# Patient Record
Sex: FEMALE | Race: WHITE | NOT HISPANIC OR LATINO | URBAN - METROPOLITAN AREA
[De-identification: names, ages, dates, MRNs, and addresses within clinical notes are randomized per-mention and may not be internally consistent; named-entity substitution may affect disease eponyms.]

---

## 2023-08-21 ENCOUNTER — OFFICE VISIT (OUTPATIENT)
Dept: PRIMARY CARE | Facility: CLINIC | Age: 63
End: 2023-08-21
Payer: COMMERCIAL

## 2023-08-21 DIAGNOSIS — Z12.31 SCREENING MAMMOGRAM FOR BREAST CANCER: ICD-10-CM

## 2023-08-21 DIAGNOSIS — R00.1 BRADYCARDIA: Primary | ICD-10-CM

## 2023-08-21 PROCEDURE — UHSMG PR UH SELECT MEET AND GREET: Performed by: INTERNAL MEDICINE

## 2023-08-21 NOTE — PROGRESS NOTES
Patient presents today for a meet and greet visit.  She is 62 years old  She has been extraordinarily healthy  Hospitalized for childbirth x2 did have plastic surgery at age 19 for her nose and had uterine ablation as an outpatient around 2010  She actually had lost a significant amount of weight over 100 pounds in 2013 by protein sparing modified fast she did regain a fair amount of this weight but then has been doing intermittent fasting since 2000 and really has done amazingly well  She does have some issues with hair loss  She has been seeing Dr. Silvestre for urinary frequency urgency incontinence she is now on estrogen patch which is really helped her menopausal symptoms and also using vaginal estrogen which has been helpful  She did have recent colonoscopy had 1 polyp we will need 5-year repeat however concerning was that she had bradycardia significant down to about 40 on EKG during colonoscopy was told she should have further evaluation  She notes that even when she was young she remembers people talking about her low heart rate  She is asymptomatic  She does exercise routinely  She is able to increment her heart rate to about 120  She used her Apple Watch and her heart rate does drop into the 40s at nighttime  She is originally from [place] 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

## 2023-09-19 PROBLEM — R42 DIZZINESS: Status: ACTIVE | Noted: 2023-09-19

## 2023-09-19 PROBLEM — N95.1 MENOPAUSAL SYMPTOMS: Status: ACTIVE | Noted: 2023-09-19

## 2023-09-19 PROBLEM — M54.16 LUMBAR NEURITIS: Status: ACTIVE | Noted: 2023-09-19

## 2023-09-19 PROBLEM — R32 URINARY INCONTINENCE IN FEMALE: Status: ACTIVE | Noted: 2023-09-19

## 2023-09-19 PROBLEM — N95.8 GENITOURINARY SYNDROME OF MENOPAUSE: Status: ACTIVE | Noted: 2023-09-19

## 2023-09-19 PROBLEM — R06.83 SNORING: Status: ACTIVE | Noted: 2023-09-19

## 2023-09-19 PROBLEM — H93.19 TINNITUS, SUBJECTIVE: Status: ACTIVE | Noted: 2023-09-19

## 2023-09-19 PROBLEM — E55.9 VITAMIN D DEFICIENCY: Status: ACTIVE | Noted: 2023-09-19

## 2023-09-19 PROBLEM — Z04.9 CONDITION NOT FOUND: Status: ACTIVE | Noted: 2023-09-19

## 2023-09-19 PROBLEM — G47.00 INSOMNIA: Status: ACTIVE | Noted: 2023-09-19

## 2023-09-19 PROBLEM — E53.8 DEFICIENCY OF OTHER SPECIFIED B GROUP VITAMINS: Status: ACTIVE | Noted: 2023-09-19

## 2023-09-19 PROBLEM — H69.90 EUSTACHIAN TUBE DYSFUNCTION: Status: ACTIVE | Noted: 2023-09-19

## 2023-09-19 PROBLEM — R63.5 ABNORMAL WEIGHT GAIN: Status: ACTIVE | Noted: 2023-09-19

## 2023-09-19 RX ORDER — SOD SULF/POT CHLORIDE/MAG SULF 1.479 G
TABLET ORAL
COMMUNITY
Start: 2023-03-27 | End: 2023-10-18 | Stop reason: ALTCHOICE

## 2023-09-19 RX ORDER — ESTRADIOL 0.1 MG/G
CREAM VAGINAL 3 TIMES WEEKLY
COMMUNITY
Start: 2023-06-08

## 2023-09-19 RX ORDER — UBIDECARENONE 75 MG
500 CAPSULE ORAL
COMMUNITY
Start: 2015-06-05

## 2023-09-19 RX ORDER — CALCIUM CARBONATE 500(1250)
TABLET ORAL
COMMUNITY
Start: 2015-06-05 | End: 2023-10-18 | Stop reason: ALTCHOICE

## 2023-09-19 RX ORDER — PROGESTERONE 100 MG/1
100 CAPSULE ORAL NIGHTLY
COMMUNITY
Start: 2023-09-07 | End: 2024-03-01

## 2023-09-19 RX ORDER — ESTRADIOL 0.05 MG/D
1 FILM, EXTENDED RELEASE TRANSDERMAL 2 TIMES WEEKLY
COMMUNITY
Start: 2023-08-26 | End: 2023-11-21

## 2023-09-19 RX ORDER — PROGESTERONE 100 MG/1
100 CAPSULE ORAL NIGHTLY
COMMUNITY
Start: 2023-06-24 | End: 2023-10-18 | Stop reason: ALTCHOICE

## 2023-09-19 RX ORDER — CHOLECALCIFEROL (VITAMIN D3) 50 MCG
50 TABLET ORAL
COMMUNITY
Start: 2015-06-05

## 2023-09-19 RX ORDER — ZOLPIDEM TARTRATE 10 MG/1
TABLET ORAL DAILY PRN
COMMUNITY
End: 2023-10-18 | Stop reason: ALTCHOICE

## 2023-09-19 RX ORDER — GABAPENTIN 100 MG/1
100 CAPSULE ORAL 3 TIMES DAILY
COMMUNITY
Start: 2023-06-13 | End: 2023-10-18 | Stop reason: ALTCHOICE

## 2023-10-12 ASSESSMENT — PROMIS GLOBAL HEALTH SCALE
EMOTIONAL_PROBLEMS: RARELY
RATE_QUALITY_OF_LIFE: VERY GOOD
RATE_AVERAGE_PAIN: 4
CARRYOUT_SOCIAL_ACTIVITIES: VERY GOOD
RATE_SOCIAL_SATISFACTION: VERY GOOD
RATE_AVERAGE_FATIGUE: MODERATE
RATE_MENTAL_HEALTH: VERY GOOD
RATE_GENERAL_HEALTH: VERY GOOD
CARRYOUT_PHYSICAL_ACTIVITIES: COMPLETELY
RATE_PHYSICAL_HEALTH: VERY GOOD

## 2023-10-13 ENCOUNTER — LAB (OUTPATIENT)
Dept: LAB | Facility: LAB | Age: 63
End: 2023-10-13
Payer: COMMERCIAL

## 2023-10-13 DIAGNOSIS — Z00.00 HEALTH MAINTENANCE EXAMINATION: ICD-10-CM

## 2023-10-13 LAB
25(OH)D3 SERPL-MCNC: 48 NG/ML (ref 30–100)
ALBUMIN SERPL BCP-MCNC: 4.5 G/DL (ref 3.4–5)
ALP SERPL-CCNC: 56 U/L (ref 33–136)
ALT SERPL W P-5'-P-CCNC: 23 U/L (ref 7–45)
ANION GAP SERPL CALC-SCNC: 14 MMOL/L (ref 10–20)
AST SERPL W P-5'-P-CCNC: 24 U/L (ref 9–39)
BACTERIA #/AREA URNS AUTO: ABNORMAL /HPF
BASOPHILS # BLD AUTO: 0.07 X10*3/UL (ref 0–0.1)
BASOPHILS NFR BLD AUTO: 1.3 %
BILIRUB SERPL-MCNC: 0.5 MG/DL (ref 0–1.2)
BUN SERPL-MCNC: 12 MG/DL (ref 6–23)
CALCIUM SERPL-MCNC: 9.6 MG/DL (ref 8.6–10.6)
CHLORIDE SERPL-SCNC: 106 MMOL/L (ref 98–107)
CHOLEST SERPL-MCNC: 182 MG/DL (ref 0–199)
CHOLESTEROL/HDL RATIO: 3.2
CO2 SERPL-SCNC: 27 MMOL/L (ref 21–32)
CREAT SERPL-MCNC: 0.72 MG/DL (ref 0.5–1.05)
CRP SERPL HS-MCNC: 1.3 MG/L
EOSINOPHIL # BLD AUTO: 0.22 X10*3/UL (ref 0–0.7)
EOSINOPHIL NFR BLD AUTO: 3.9 %
ERYTHROCYTE [DISTWIDTH] IN BLOOD BY AUTOMATED COUNT: 13.2 % (ref 11.5–14.5)
EST. AVERAGE GLUCOSE BLD GHB EST-MCNC: 94 MG/DL
GFR SERPL CREATININE-BSD FRML MDRD: >90 ML/MIN/1.73M*2
GLUCOSE SERPL-MCNC: 96 MG/DL (ref 74–99)
HBA1C MFR BLD: 4.9 %
HCT VFR BLD AUTO: 47.8 % (ref 36–46)
HDLC SERPL-MCNC: 56.8 MG/DL
HGB BLD-MCNC: 15.2 G/DL (ref 12–16)
IMM GRANULOCYTES # BLD AUTO: 0.02 X10*3/UL (ref 0–0.7)
IMM GRANULOCYTES NFR BLD AUTO: 0.4 % (ref 0–0.9)
LDLC SERPL CALC-MCNC: 117 MG/DL
LYMPHOCYTES # BLD AUTO: 1.62 X10*3/UL (ref 1.2–4.8)
LYMPHOCYTES NFR BLD AUTO: 29 %
MCH RBC QN AUTO: 29.1 PG (ref 26–34)
MCHC RBC AUTO-ENTMCNC: 31.8 G/DL (ref 32–36)
MCV RBC AUTO: 92 FL (ref 80–100)
MONOCYTES # BLD AUTO: 0.47 X10*3/UL (ref 0.1–1)
MONOCYTES NFR BLD AUTO: 8.4 %
NEUTROPHILS # BLD AUTO: 3.19 X10*3/UL (ref 1.2–7.7)
NEUTROPHILS NFR BLD AUTO: 57 %
NON HDL CHOLESTEROL: 125 MG/DL (ref 0–149)
NRBC BLD-RTO: 0 /100 WBCS (ref 0–0)
PLATELET # BLD AUTO: 290 X10*3/UL (ref 150–450)
PMV BLD AUTO: 10.9 FL (ref 7.5–11.5)
POTASSIUM SERPL-SCNC: 4.7 MMOL/L (ref 3.5–5.3)
PROT SERPL-MCNC: 7.6 G/DL (ref 6.4–8.2)
RBC # BLD AUTO: 5.22 X10*6/UL (ref 4–5.2)
RBC #/AREA URNS AUTO: ABNORMAL /HPF
SODIUM SERPL-SCNC: 142 MMOL/L (ref 136–145)
SQUAMOUS #/AREA URNS AUTO: ABNORMAL /HPF
TRIGL SERPL-MCNC: 40 MG/DL (ref 0–149)
TSH SERPL-ACNC: 1.32 MIU/L (ref 0.44–3.98)
VLDL: 8 MG/DL (ref 0–40)
WBC # BLD AUTO: 5.6 X10*3/UL (ref 4.4–11.3)
WBC #/AREA URNS AUTO: ABNORMAL /HPF

## 2023-10-13 PROCEDURE — 83036 HEMOGLOBIN GLYCOSYLATED A1C: CPT

## 2023-10-13 PROCEDURE — 84443 ASSAY THYROID STIM HORMONE: CPT

## 2023-10-13 PROCEDURE — 36415 COLL VENOUS BLD VENIPUNCTURE: CPT

## 2023-10-13 PROCEDURE — 80061 LIPID PANEL: CPT

## 2023-10-13 PROCEDURE — 80053 COMPREHEN METABOLIC PANEL: CPT

## 2023-10-13 PROCEDURE — 86141 C-REACTIVE PROTEIN HS: CPT

## 2023-10-13 PROCEDURE — 85025 COMPLETE CBC W/AUTO DIFF WBC: CPT

## 2023-10-13 PROCEDURE — 82306 VITAMIN D 25 HYDROXY: CPT

## 2023-10-13 PROCEDURE — 81001 URINALYSIS AUTO W/SCOPE: CPT

## 2023-10-17 NOTE — PROGRESS NOTES
Physical Exam    Name Berta Galaviz    Date of Service :10/18/2023      Berta Galaviz  62 y.o. is here for an annual physical exam   she has been extraordinarily healthy over time past medical history significant for weight loss and is lost over 100 pounds in 2013 using protein sparing modified fast she regained some of this weight but has been doing intermittent fasting and currently doing very well  She has had some issues with hair loss with the protein sparing modified fast  She has had issues with urinary frequency urgency and incontinence and has been seeing Dr. Silvsetre now on estrogen patch which is also helped her menopausal symptoms she also uses vaginal estrogen  Bradycardia during her colonoscopy but we have done Zio patch which looked okay  She has always had low heart rate and is asymptomatic  Heart rate will drop into the 40s noted on her Apple Watch at nighttime  Zio patch did show heart rate as low as 39 there was episode of ventricular tachycardia but brief as well as supraventricular which she really does not think she was aware of perhaps 1 episode  She has been hospitalized for childbirth x2 had plastic surgery at age 19 for her nose she did have a uterine ablation around 2010  Overall she is feeling well  She has been able to maintain her weight  She has had hair loss taking topical Rogaine supplements has spoken with Dr. Issa in the past as he has a close friend  Knee pain which has been bothersome in the past but seems to be doing pretty well she is trying to walk more has been told she may need a knee replacement in the future  Most recently right foot pain over about the last 2 months no known injury worse with walking concerned that she could have a stress fracture      Past Medical History:   Diagnosis Date    Other specified health status 06/05/2015    No pertinent past medical history       Past Surgical History:   Procedure Laterality Date    ADENOIDECTOMY  06/05/2015     "Adenoidectomy     SECTION, CLASSIC  2015     Section    OTHER SURGICAL HISTORY  2013    Uterine Surgery    RHINOPLASTY  2015    Rhinoplasty    TONSILLECTOMY  2015    Tonsillectomy        Social History     Tobacco Use    Smoking status: Never    Smokeless tobacco: Never   Vaping Use    Vaping Use: Never used        Social History     Social History Narrative    Is originally from  [place] went to [employer] school at [employer] moved to [place] planning to go back to 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 [facility]    2 daughters ages 31 and 29 who have just been  recently 1 in [place] 1 in [place]    Diet is healthy doing more of intermittent fasting    Exercise she has been walking on a daily basis and does resistance training about 1 time weekly with a     She has never been a smoker    Occasional alcohol            Increased stress with aging parents who live in [place]       Family History   Problem Relation Name Age of Onset    Coronary artery disease Mother  75    Polycythemia Mother      Ulcerative colitis Father      Other (spiansl stenosis) Father      No Known Problems Sister      Other (djd) Brother      No Known Problems Brother          /78   Ht 1.715 m (5' 7.5\")   Wt 77.1 kg (170 lb)   BMI 26.23 kg/m²     Physical Exam  Physical examination  Reveals a well-developed woman in no acute distress    appearance is younger than age no acute distress  HEENT exam  Extraocular motion is intact  Tympanic membranes and external auditory canals are normal  Oropharynx is normal  There is no cervical lymphadenopathy appreciated  The thyroid is within normal limits    Lungs    clear to auscultation and percussion    Cardiovascular   regular rate and rhythm  No murmurs rubs or gallops are " "appreciated    Breast examination   No dominant masses nipple discharge or axillary lymphadenopathy is appreciated    Abdomen   soft nontender bowel sounds are positive   there is no organomegaly noted      Periphery  Pulses are present without deficits noted  No peripheral edema is noted    Musculoskeletal  Gait is normal  Is no joint erythema or swelling noted  Range of motion is within normal limits  Strength is 5 of 5 without deficits noted  Minimal pain to palpation in right foot on top but no erythema noted no swelling    Dermatology  No concerning skin lesions are noted    Neurology  No deficits are noted  Judgment appears appropriate  Mood and affect are appropriate         Results from last 7 days   Lab Units 10/13/23  0908   WBC AUTO x10*3/uL 5.6   HEMOGLOBIN g/dL 15.2   HEMATOCRIT % 47.8*   PLATELETS AUTO x10*3/uL 290   NEUTROS PCT AUTO % 57.0   LYMPHS PCT AUTO % 29.0   MONOS PCT AUTO % 8.4   EOS PCT AUTO % 3.9      Results from last 7 days   Lab Units 10/13/23  0908   HEMOGLOBIN A1C % 4.9     Results from last 7 days   Lab Units 10/13/23  0908   SODIUM mmol/L 142   POTASSIUM mmol/L 4.7   CHLORIDE mmol/L 106   CO2 mmol/L 27   BUN mg/dL 12   CREATININE mg/dL 0.72   CALCIUM mg/dL 9.6   PROTEIN TOTAL g/dL 7.6   BILIRUBIN TOTAL mg/dL 0.5   ALK PHOS U/L 56   ALT U/L 23   AST U/L 24   GLUCOSE mg/dL 96      Results from last 7 days   Lab Units 10/13/23  0908   CHOLESTEROL mg/dL 182   TRIGLYCERIDES mg/dL 40   HDL mg/dL 56.8           Results from last 7 days   Lab Units 10/13/23  0908   TSH mIU/L 1.32           No lab exists for component: \"UAPPEAR\", \"UCOLOR\"  No components found for: \"UA\"  Lab on 10/13/2023   Component Date Value Ref Range Status    WBC 10/13/2023 5.6  4.4 - 11.3 x10*3/uL Final    nRBC 10/13/2023 0.0  0.0 - 0.0 /100 WBCs Final    RBC 10/13/2023 5.22 (H)  4.00 - 5.20 x10*6/uL Final    Hemoglobin 10/13/2023 15.2  12.0 - 16.0 g/dL Final    Hematocrit 10/13/2023 47.8 (H)  36.0 - 46.0 % Final    MCV " 10/13/2023 92  80 - 100 fL Final    MCH 10/13/2023 29.1  26.0 - 34.0 pg Final    MCHC 10/13/2023 31.8 (L)  32.0 - 36.0 g/dL Final    RDW 10/13/2023 13.2  11.5 - 14.5 % Final    Platelets 10/13/2023 290  150 - 450 x10*3/uL Final    MPV 10/13/2023 10.9  7.5 - 11.5 fL Final    Neutrophils % 10/13/2023 57.0  40.0 - 80.0 % Final    Immature Granulocytes %, Automated 10/13/2023 0.4  0.0 - 0.9 % Final    Immature Granulocyte Count (IG) includes promyelocytes, myelocytes and metamyelocytes but does not include bands. Percent differential counts (%) should be interpreted in the context of the absolute cell counts (cells/UL).    Lymphocytes % 10/13/2023 29.0  13.0 - 44.0 % Final    Monocytes % 10/13/2023 8.4  2.0 - 10.0 % Final    Eosinophils % 10/13/2023 3.9  0.0 - 6.0 % Final    Basophils % 10/13/2023 1.3  0.0 - 2.0 % Final    Neutrophils Absolute 10/13/2023 3.19  1.20 - 7.70 x10*3/uL Final    Percent differential counts (%) should be interpreted in the context of the absolute cell counts (cells/uL).    Immature Granulocytes Absolute, Au* 10/13/2023 0.02  0.00 - 0.70 x10*3/uL Final    Lymphocytes Absolute 10/13/2023 1.62  1.20 - 4.80 x10*3/uL Final    Monocytes Absolute 10/13/2023 0.47  0.10 - 1.00 x10*3/uL Final    Eosinophils Absolute 10/13/2023 0.22  0.00 - 0.70 x10*3/uL Final    Basophils Absolute 10/13/2023 0.07  0.00 - 0.10 x10*3/uL Final    Glucose 10/13/2023 96  74 - 99 mg/dL Final    Sodium 10/13/2023 142  136 - 145 mmol/L Final    Potassium 10/13/2023 4.7  3.5 - 5.3 mmol/L Final    Chloride 10/13/2023 106  98 - 107 mmol/L Final    Bicarbonate 10/13/2023 27  21 - 32 mmol/L Final    Anion Gap 10/13/2023 14  10 - 20 mmol/L Final    Urea Nitrogen 10/13/2023 12  6 - 23 mg/dL Final    Creatinine 10/13/2023 0.72  0.50 - 1.05 mg/dL Final    eGFR 10/13/2023 >90  >60 mL/min/1.73m*2 Final    Calculations of estimated GFR are performed using the 2021 CKD-EPI Study Refit equation without the race variable for the  IDMS-Traceable creatinine methods.  https://jasn.asnjournals.org/content/early/2021/09/22/ASN.2689461406    Calcium 10/13/2023 9.6  8.6 - 10.6 mg/dL Final    Albumin 10/13/2023 4.5  3.4 - 5.0 g/dL Final    Alkaline Phosphatase 10/13/2023 56  33 - 136 U/L Final    Total Protein 10/13/2023 7.6  6.4 - 8.2 g/dL Final    AST 10/13/2023 24  9 - 39 U/L Final    Bilirubin, Total 10/13/2023 0.5  0.0 - 1.2 mg/dL Final    ALT 10/13/2023 23  7 - 45 U/L Final    Patients treated with Sulfasalazine may generate falsely decreased results for ALT.    CRP, High Sensitivity 10/13/2023 1.3  >=1.0 mg/L Final    Hemoglobin A1C 10/13/2023 4.9  see below % Final    Estimated Average Glucose 10/13/2023 94  Not Established mg/dL Final    Cholesterol 10/13/2023 182  0 - 199 mg/dL Final          Age      Desirable   Borderline High   High     0-19 Y     0 - 169       170 - 199     >/= 200    20-24 Y     0 - 189       190 - 224     >/= 225         >24 Y     0 - 199       200 - 239     >/= 240   **All ranges are based on fasting samples. Specific   therapeutic targets will vary based on patient-specific   cardiac risk.    Pediatric guidelines reference:Pediatrics 2011, 128(S5).Adult guidelines reference: NCEP ATPIII Guidelines,WILMER 2001, 258:2486-97    Venipuncture immediately after or during the administration of Metamizole may lead to falsely low results. Testing should be performed immediately prior to Metamizole dosing.    HDL-Cholesterol 10/13/2023 56.8  mg/dL Final      Age       Very Low   Low     Normal    High    0-19 Y    < 35      < 40     40-45     ----  20-24 Y    ----     < 40      >45      ----        >24 Y      ----     < 40     40-60      >60      Cholesterol/HDL Ratio 10/13/2023 3.2   Final      Ref Values  Desirable  < 3.4  High Risk  > 5.0    LDL Calculated 10/13/2023 117 (H)  <=99 mg/dL Final                                Near   Borderline      AGE      Desirable  Optimal    High     High     Very High     0-19 Y     0 -  109     ---    110-129   >/= 130     ----    20-24 Y     0 - 119     ---    120-159   >/= 160     ----      >24 Y     0 -  99   100-129  130-159   160-189     >/=190      VLDL 10/13/2023 8  0 - 40 mg/dL Final    Triglycerides 10/13/2023 40  0 - 149 mg/dL Final       Age         Desirable   Borderline High   High     Very High   0 D-90 D    19 - 174         ----         ----        ----  91 D- 9 Y     0 -  74        75 -  99     >/= 100      ----    10-19 Y     0 -  89        90 - 129     >/= 130      ----    20-24 Y     0 - 114       115 - 149     >/= 150      ----         >24 Y     0 - 149       150 - 199    200- 499    >/= 500    Venipuncture immediately after or during the administration of Metamizole may lead to falsely low results. Testing should be performed immediately prior to Metamizole dosing.    Non HDL Cholesterol 10/13/2023 125  0 - 149 mg/dL Final          Age       Desirable   Borderline High   High     Very High     0-19 Y     0 - 119       120 - 144     >/= 145    >/= 160    20-24 Y     0 - 149       150 - 189     >/= 190      ----         >24 Y    30 mg/dL above LDL Cholesterol goal      Thyroid Stimulating Hormone 10/13/2023 1.32  0.44 - 3.98 mIU/L Final    WBC, Urine 10/13/2023 1-5  1-5, NONE /HPF Final    RBC, Urine 10/13/2023 1-2  NONE, 1-2, 3-5 /HPF Final    Squamous Epithelial Cells, Urine 10/13/2023 1-9 (SPARSE)  Reference range not established. /HPF Final    Bacteria, Urine 10/13/2023 3+ (A)  NONE SEEN /HPF Final    Vitamin D, 25-Hydroxy, Total 10/13/2023 48  30 - 100 ng/mL Final     [unfilled]   No results found.   The 10-year ASCVD risk score (Agustina DK, et al., 2019) is: 2.5%    Values used to calculate the score:      Age: 62 years      Sex: Female      Is Non- : No      Diabetic: No      Tobacco smoker: No      Systolic Blood Pressure: 110 mmHg      Is BP treated: No      HDL Cholesterol: 56.8 mg/dL      Total Cholesterol: 182 mg/dL   .     Problem List Items  Addressed This Visit    None  Visit Diagnoses       Pain of right lower extremity    -  Primary    Relevant Orders    XR foot right 1-2 views    Erythrocytosis        Relevant Orders    CBC and Auto Differential            Assessment/Plan   #1 right foot pain which has been present now for the last few months wonders if she could have a stress fracture she has really increased her walking she thinks her shoes are good for walking we will obtain an x-ray looking for stress fracture if continued issues consider probably podiatry as next step may need orthotics  2.  Erythrocytosis somewhat concerning to her as her mom does have polycythemia I am not overly concerned but her blood count is slightly higher than previous we will recheck in 6 months order has been placed  3.  History of colonic polyp will need repeat colonoscopy in 2028  4.  Hair loss some of this may have been nutritional but has continued apparently her sister has some hair loss as well agree with her current plan referral to dermatology Dr. Issa as indicated she has tried PRP in the past as well  5.  Health maintenance  Congratulated her on healthy lifestyle  I agree adding another day of resistance training is indicated  Flu shot was given today  She has had Shingrix vaccination and Covid 19 booster  She is up-to-date with gynecology as well  Up to date with mammograms as well  #6 urogynecology she is up-to-date with urogynecology the estrogen has been very helpful she has follow-up appointment with Dr. Silvestre this week thinks the urinary frequency is somewhat improved as well as vaginal dryness issues

## 2023-10-18 ENCOUNTER — OFFICE VISIT (OUTPATIENT)
Dept: PRIMARY CARE | Facility: CLINIC | Age: 63
End: 2023-10-18
Payer: COMMERCIAL

## 2023-10-18 VITALS
BODY MASS INDEX: 25.76 KG/M2 | SYSTOLIC BLOOD PRESSURE: 110 MMHG | WEIGHT: 170 LBS | DIASTOLIC BLOOD PRESSURE: 78 MMHG | HEIGHT: 68 IN

## 2023-10-18 DIAGNOSIS — M79.604 PAIN OF RIGHT LOWER EXTREMITY: Primary | ICD-10-CM

## 2023-10-18 DIAGNOSIS — D75.1 ERYTHROCYTOSIS: ICD-10-CM

## 2023-10-18 PROCEDURE — 90686 IIV4 VACC NO PRSV 0.5 ML IM: CPT | Performed by: INTERNAL MEDICINE

## 2023-10-18 PROCEDURE — 1036F TOBACCO NON-USER: CPT | Performed by: INTERNAL MEDICINE

## 2023-10-18 PROCEDURE — UHSPHYS PR UH SELECT PHYSICAL: Performed by: INTERNAL MEDICINE

## 2023-10-18 PROCEDURE — 90471 IMMUNIZATION ADMIN: CPT | Performed by: INTERNAL MEDICINE

## 2023-10-18 NOTE — PATIENT INSTRUCTIONS
It was good to see you.  You are doing a good job with your overall health care.   I do agree that adding another day of resistance training is a good idea.  We will obtain an x-ray of your foot because of the discomfort to make sure we do not see a stress fracture if it persists we may have you see podiatry versus orthopedic foot specialist  Repeat colonoscopy 2028  Stay up-to-date with gynecology you are up-to-date with mammogram  Flu vaccine was given today  Plan to repeat a blood count in about 6 months just to keep track of CBC with slightly elevated hemoglobin hematocrit  I encourage you to stay active and healthy, and to follow these healthy habits:     Try to increase your intake of fish such as salmon and tuna and try to get 2 to 3 servings of fish per week.  Increase your intake of plant-based protein listed here:    Unprocessed nuts, walnuts, or almonds, Nuts and Seeds.      Green vegetables such as Broccoli, Peas, Greens, Spinach    Beans, Chickpeas, & Lentils    Other sources include Potatoes, Quinoa, Seaweed, Soymilk, Tempeh, and Tofu.  Increase food s higher in flavonoids found in black tea, green tea, apples, nuts, citrus fruit, berries, and dark chocolate.  You should avoid fried foods, and sugary or starchy foods and sugary drinks, and void saturated fats.    Try not to dine at restaurants frequently  and avoid fast food restaurants.      Try to get restful sleep approximately 7-9 hours every night.  Work towards getting 30 minutes of  moderate intensity exercise 4 to 5 days per week.  You should also try to exercise at least one hour per day with light walking.

## 2023-10-19 ENCOUNTER — OFFICE VISIT (OUTPATIENT)
Dept: UROLOGY | Facility: CLINIC | Age: 63
End: 2023-10-19
Payer: COMMERCIAL

## 2023-10-19 VITALS
HEART RATE: 74 BPM | BODY MASS INDEX: 26.53 KG/M2 | DIASTOLIC BLOOD PRESSURE: 84 MMHG | TEMPERATURE: 97.8 F | HEIGHT: 67 IN | SYSTOLIC BLOOD PRESSURE: 124 MMHG | WEIGHT: 169 LBS

## 2023-10-19 DIAGNOSIS — N95.8 GENITOURINARY SYNDROME OF MENOPAUSE: Primary | ICD-10-CM

## 2023-10-19 PROCEDURE — 99214 OFFICE O/P EST MOD 30 MIN: CPT | Performed by: OBSTETRICS & GYNECOLOGY

## 2023-10-19 PROCEDURE — 1036F TOBACCO NON-USER: CPT | Performed by: OBSTETRICS & GYNECOLOGY

## 2023-10-19 ASSESSMENT — PAIN SCALES - GENERAL: PAINLEVEL: 0-NO PAIN

## 2023-10-19 ASSESSMENT — ENCOUNTER SYMPTOMS: SLEEP DISTURBANCE: 1

## 2023-10-19 NOTE — PROGRESS NOTES
Subjective   Patient ID: Berta Galaviz is a 62 y.o. female with a history of who presents for Follow-up.  Patient reports she is doing well with improvement in her symptoms which include urgency, vaginal dryness and insomnia. She is feeling overall better since starting the estrogen patch. Bladder training has also helped her to be mindful of triggers. However she adds that has she has reduced her coffee intake.  She notes her insomnia has improved also, but it's not completely better. She was hesitant to take Gabapentin three times a day due to decreased HR during colonoscopy.  She also adds that she has a very low resting heart rate in general        Review of Systems   Genitourinary:  Positive for urgency (resolving).        Vaginal dryness, resolving   Psychiatric/Behavioral:  Positive for sleep disturbance (insomnia, resolving).        Objective   Physical Exam  Genitourinary:     Comments: Pap smear result came back normal.        Assessment/Plan      Urinary urgency and overactive bladder: Improving, patient is doing well. Continue MHT. WE discussed we can revisit continuation/discontinuation on a yearly basis making a shared decision based on her medical risks/benefits.   Declines any medication for OAB at this time.   Suggested trying gabapentin just once a day rather than TID if needed.      Follow-up in one year.       Scribe Attestation  By signing my name below, ISheila Scribe   attest that this documentation has been prepared under the direction and in the presence of Kaitlyn Silvestre MD MPH.

## 2023-10-24 ENCOUNTER — ANCILLARY PROCEDURE (OUTPATIENT)
Dept: RADIOLOGY | Facility: CLINIC | Age: 63
End: 2023-10-24
Payer: COMMERCIAL

## 2023-10-24 DIAGNOSIS — M79.604 PAIN OF RIGHT LOWER EXTREMITY: ICD-10-CM

## 2023-10-24 PROCEDURE — 73630 X-RAY EXAM OF FOOT: CPT | Mod: RIGHT SIDE

## 2023-10-24 PROCEDURE — 73630 X-RAY EXAM OF FOOT: CPT | Mod: RT,FY

## 2023-11-21 DIAGNOSIS — N95.1 MENOPAUSAL AND FEMALE CLIMACTERIC STATES: ICD-10-CM

## 2023-11-21 DIAGNOSIS — N95.1 MENOPAUSAL SYMPTOMS: ICD-10-CM

## 2023-11-21 RX ORDER — ESTRADIOL 0.05 MG/D
1 FILM, EXTENDED RELEASE TRANSDERMAL 2 TIMES WEEKLY
Qty: 24 PATCH | Refills: 1 | Status: SHIPPED | OUTPATIENT
Start: 2023-11-23

## 2023-11-21 RX ORDER — ESTRADIOL 0.05 MG/D
1 FILM, EXTENDED RELEASE TRANSDERMAL 2 TIMES WEEKLY
Qty: 24 PATCH | Refills: 3 | Status: SHIPPED | OUTPATIENT
Start: 2023-11-23 | End: 2024-11-22

## 2024-03-01 DIAGNOSIS — N95.1 MENOPAUSAL SYMPTOMS: ICD-10-CM

## 2024-03-01 RX ORDER — PROGESTERONE 100 MG/1
100 CAPSULE ORAL NIGHTLY
Qty: 90 CAPSULE | Refills: 2 | Status: SHIPPED | OUTPATIENT
Start: 2024-03-01

## 2024-05-10 ENCOUNTER — LAB (OUTPATIENT)
Dept: LAB | Facility: LAB | Age: 64
End: 2024-05-10
Payer: COMMERCIAL

## 2024-05-10 DIAGNOSIS — D75.1 ERYTHROCYTOSIS: ICD-10-CM

## 2024-05-10 LAB
BASOPHILS # BLD AUTO: 0.06 X10*3/UL (ref 0–0.1)
BASOPHILS NFR BLD AUTO: 1.2 %
EOSINOPHIL # BLD AUTO: 0.17 X10*3/UL (ref 0–0.7)
EOSINOPHIL NFR BLD AUTO: 3.3 %
ERYTHROCYTE [DISTWIDTH] IN BLOOD BY AUTOMATED COUNT: 12.9 % (ref 11.5–14.5)
HCT VFR BLD AUTO: 46.8 % (ref 36–46)
HGB BLD-MCNC: 14.9 G/DL (ref 12–16)
IMM GRANULOCYTES # BLD AUTO: 0.01 X10*3/UL (ref 0–0.7)
IMM GRANULOCYTES NFR BLD AUTO: 0.2 % (ref 0–0.9)
LYMPHOCYTES # BLD AUTO: 1.62 X10*3/UL (ref 1.2–4.8)
LYMPHOCYTES NFR BLD AUTO: 31.9 %
MCH RBC QN AUTO: 28.9 PG (ref 26–34)
MCHC RBC AUTO-ENTMCNC: 31.8 G/DL (ref 32–36)
MCV RBC AUTO: 91 FL (ref 80–100)
MONOCYTES # BLD AUTO: 0.33 X10*3/UL (ref 0.1–1)
MONOCYTES NFR BLD AUTO: 6.5 %
NEUTROPHILS # BLD AUTO: 2.89 X10*3/UL (ref 1.2–7.7)
NEUTROPHILS NFR BLD AUTO: 56.9 %
NRBC BLD-RTO: 0 /100 WBCS (ref 0–0)
PLATELET # BLD AUTO: 273 X10*3/UL (ref 150–450)
RBC # BLD AUTO: 5.16 X10*6/UL (ref 4–5.2)
WBC # BLD AUTO: 5.1 X10*3/UL (ref 4.4–11.3)

## 2024-05-10 PROCEDURE — 85025 COMPLETE CBC W/AUTO DIFF WBC: CPT

## 2024-05-10 PROCEDURE — 36415 COLL VENOUS BLD VENIPUNCTURE: CPT

## 2024-10-09 NOTE — PROGRESS NOTES
FOLLOW-UP VISIT       HISTORY OF PRESENT ILLNESS:   Berta Galaviz is a 63 y.o. female who presents to me today for follow-up of OAB, GSM, menopause symptoms. Doing well with rare issues with incontinence at this point. Menopause symptoms under control and no side effects on vivelle dot, prometrium, estrace. Does find estrace cream makes it difficult to sleep due to sensation of it but will try using it in the morning.    Interested in genetic counseling for ovarian cancer risk due to family history in aunt who had both breast and ovarian cancer.    PAST MEDICAL HISTORY:  Past Medical History:   Diagnosis Date    Other specified health status 2015    No pertinent past medical history       PAST SURGICAL HISTORY:  Past Surgical History:   Procedure Laterality Date    ADENOIDECTOMY  2015    Adenoidectomy     SECTION, CLASSIC  ,      Section    OTHER SURGICAL HISTORY      endometrial ablation    RHINOPLASTY  2015    Rhinoplasty    TONSILLECTOMY  2015    Tonsillectomy        ALLERGIES:   No Known Allergies     MEDICATIONS:   Medication Documentation Review Audit       Reviewed by Merary Duron MA (Medical Assistant) on 10/10/24 at 0811      Medication Order Taking? Sig Documenting Provider Last Dose Status   cholecalciferol (Vitamin D-3) 50 MCG (2000 UT) tablet [id number] Yes Take 1 tablet (50 mcg) by mouth. Historical Provider, MD Taking Active   cyanocobalamin (Vitamin B-12) 500 mcg tablet [id number] Yes Take 1 tablet (500 mcg) by mouth. Historical Provider, MD Taking Active   estradiol (Estrace) 0.01 % (0.1 mg/gram) vaginal cream [id number] Yes 3 (three) times a week.  APPLY A PEA-SIZED AMOUNT INTO VAGINA AT BEDTIME Historical Provider, MD Taking Active   estradiol (Vivelle-DOT) 0.05 mg/24 hr patch [id number] Yes APPLY 1 PATCH TO SKIN 2 TIMES EVERY WEEK AS DIRECTED Kaitlyn Silvestre MD MPH Taking Active   estradiol (Vivelle-Dot) 0.05 mg/24 hr patch [id number] Yes Place 1  patch on the skin 2 times a week. Kaitlyn Silvestre MD MPH Taking Active   progesterone (Prometrium) 100 mg capsule [id number] Yes TAKE 1 CAPSULE BY MOUTH EVERYDAY AT BEDTIME Kaitlyn Silvestre MD MPH Taking Active                     SOCIAL HISTORY:  Patient  reports that she has never smoked. She has never used smokeless tobacco.   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    2 daughters ages 31 and 29 who have just been  recently 1 in [place] 1 in [place]    Diet is healthy doing more of intermittent fasting    Exercise she has been walking on a daily basis and does resistance training about 1 time weekly with a     She has never been a smoker    Occasional alcohol            Increased stress with aging parents who live in [place]     Social Determinants of Health     Financial Resource Strain: Not on file   Food Insecurity: Not on file   Transportation Needs: Not on file   Physical Activity: Not on file   Stress: Not on file   Social Connections: Not on file   Intimate Partner Violence: Not on file   Housing Stability: Not on file       FAMILY HISTORY:  Family History   Problem Relation Name Age of Onset    Coronary  "artery disease Mother  75    Polycythemia Mother      Ulcerative colitis Father      Other (spiansl stenosis) Father      No Known Problems Sister      Other (djd) Brother      No Known Problems Brother      Ovarian cancer Mother's Sister      Breast cancer Mother's Sister         REVIEW OF SYSTEMS:  Constitutional: Negative for fever and chills. Denies anorexia, weight loss.  Eyes: Negative for visual disturbance.   Respiratory: Negative for shortness of breath.    Cardiovascular: Negative for chest pain.   Gastrointestinal: Negative for nausea and vomiting.   Genitourinary: See interval history above.  Skin: Negative for rash.   Neurological: Negative for dizziness and numbness.   Psychiatric/Behavioral: Negative for confusion and decreased concentration.     PHYSICAL EXAM:  Blood pressure (!) 149/93, pulse 60, temperature 36.4 °C (97.6 °F), height 1.702 m (5' 7\"), weight 79.4 kg (175 lb).  Constitutional: Patient appears well-developed and well-nourished. No distress.    Head: Normocephalic and atraumatic.    Neck: Normal range of motion.    Cardiovascular: Normal rate.    Pulmonary/Chest: Effort normal. No respiratory distress.   : deferred  Musculoskeletal: Normal range of motion.    Neurological: Alert and oriented to person, place, and time.  Psychiatric: Normal mood and affect. Behavior is normal. Thought content normal.       Assessment:      1. Menopausal symptoms  estradiol (Vivelle-Dot) 0.05 mg/24 hr patch    progesterone (Prometrium) 100 mg capsule    estradiol (Estrace) 0.01 % (0.1 mg/gram) vaginal cream    DISCONTINUED: estradiol (Estrace) 0.01 % (0.1 mg/gram) vaginal cream      2. Encounter for screening mammogram for malignant neoplasm of breast  BI mammo bilateral screening tomosynthesis      3. Family history of ovarian cancer  Ambulatory referral to Genetics      4. Genitourinary syndrome of menopause  estradiol (Estrace) 0.01 % (0.1 mg/gram) vaginal cream    DISCONTINUED: estradiol (Estrace) " 0.01 % (0.1 mg/gram) vaginal cream          Berta Galaviz is a 63 y.o. female with OAB and GSM.      Plan:   Prescription for MHT refill & vaginal estrace sent to pharmacy  Referred to oncologic genetic testing due to family hx of ovarian/breast cancer  Follow-up in 1 year.     Discussed with Dr. Pope Vielka Bustillo MD  Female Reconstruction & Sexual Medicine Fellow  Dept of Urology/OBGYN  10/10/2024

## 2024-10-10 ENCOUNTER — APPOINTMENT (OUTPATIENT)
Dept: UROLOGY | Facility: CLINIC | Age: 64
End: 2024-10-10
Payer: COMMERCIAL

## 2024-10-10 VITALS
HEART RATE: 60 BPM | BODY MASS INDEX: 27.47 KG/M2 | HEIGHT: 67 IN | WEIGHT: 175 LBS | TEMPERATURE: 97.6 F | SYSTOLIC BLOOD PRESSURE: 149 MMHG | DIASTOLIC BLOOD PRESSURE: 93 MMHG

## 2024-10-10 DIAGNOSIS — Z80.41 FAMILY HISTORY OF OVARIAN CANCER: ICD-10-CM

## 2024-10-10 DIAGNOSIS — Z12.31 ENCOUNTER FOR SCREENING MAMMOGRAM FOR MALIGNANT NEOPLASM OF BREAST: ICD-10-CM

## 2024-10-10 DIAGNOSIS — N95.8 GENITOURINARY SYNDROME OF MENOPAUSE: ICD-10-CM

## 2024-10-10 DIAGNOSIS — N95.1 MENOPAUSAL SYMPTOMS: Primary | ICD-10-CM

## 2024-10-10 PROCEDURE — 1036F TOBACCO NON-USER: CPT | Performed by: OBSTETRICS & GYNECOLOGY

## 2024-10-10 PROCEDURE — 3008F BODY MASS INDEX DOCD: CPT | Performed by: OBSTETRICS & GYNECOLOGY

## 2024-10-10 PROCEDURE — 99213 OFFICE O/P EST LOW 20 MIN: CPT | Performed by: OBSTETRICS & GYNECOLOGY

## 2024-10-10 RX ORDER — PROGESTERONE 100 MG/1
100 CAPSULE ORAL NIGHTLY
Qty: 90 CAPSULE | Refills: 2 | Status: SHIPPED | OUTPATIENT
Start: 2024-10-10

## 2024-10-10 RX ORDER — ESTRADIOL 0.05 MG/D
1 FILM, EXTENDED RELEASE TRANSDERMAL 2 TIMES WEEKLY
Qty: 24 PATCH | Refills: 3 | Status: SHIPPED | OUTPATIENT
Start: 2024-10-10 | End: 2025-10-10

## 2024-10-10 RX ORDER — ESTRADIOL 0.1 MG/G
2 CREAM VAGINAL 3 TIMES WEEKLY
Qty: 42.5 G | Refills: 11 | Status: SHIPPED | OUTPATIENT
Start: 2024-10-11

## 2024-10-10 RX ORDER — ESTRADIOL 0.1 MG/G
2 CREAM VAGINAL 3 TIMES WEEKLY
Qty: 42.5 G | Refills: 11 | Status: SHIPPED | OUTPATIENT
Start: 2024-10-11 | End: 2024-10-10 | Stop reason: SDUPTHER

## 2024-10-10 ASSESSMENT — PAIN SCALES - GENERAL: PAINLEVEL: 0-NO PAIN

## 2024-10-17 ENCOUNTER — APPOINTMENT (OUTPATIENT)
Dept: UROLOGY | Facility: CLINIC | Age: 64
End: 2024-10-17
Payer: COMMERCIAL

## 2024-10-23 ENCOUNTER — HOSPITAL ENCOUNTER (OUTPATIENT)
Dept: RADIOLOGY | Facility: CLINIC | Age: 64
Discharge: HOME | End: 2024-10-23
Payer: COMMERCIAL

## 2024-10-23 VITALS — HEIGHT: 67 IN | WEIGHT: 175.04 LBS | BODY MASS INDEX: 27.47 KG/M2

## 2024-10-23 DIAGNOSIS — Z12.31 ENCOUNTER FOR SCREENING MAMMOGRAM FOR MALIGNANT NEOPLASM OF BREAST: ICD-10-CM

## 2024-10-23 PROCEDURE — 77067 SCR MAMMO BI INCL CAD: CPT

## 2024-10-29 DIAGNOSIS — Z00.00 BLOOD TESTS FOR ROUTINE GENERAL PHYSICAL EXAMINATION: ICD-10-CM

## 2024-11-06 ENCOUNTER — LAB (OUTPATIENT)
Dept: LAB | Facility: LAB | Age: 64
End: 2024-11-06
Payer: COMMERCIAL

## 2024-11-06 DIAGNOSIS — Z00.00 BLOOD TESTS FOR ROUTINE GENERAL PHYSICAL EXAMINATION: ICD-10-CM

## 2024-11-06 LAB
25(OH)D3 SERPL-MCNC: 34 NG/ML (ref 30–100)
ALBUMIN SERPL BCP-MCNC: 4.2 G/DL (ref 3.4–5)
ALP SERPL-CCNC: 46 U/L (ref 33–136)
ALT SERPL W P-5'-P-CCNC: 15 U/L (ref 7–45)
ANION GAP SERPL CALC-SCNC: 13 MMOL/L (ref 10–20)
APPEARANCE UR: CLEAR
AST SERPL W P-5'-P-CCNC: 19 U/L (ref 9–39)
BASOPHILS # BLD AUTO: 0.07 X10*3/UL (ref 0–0.1)
BASOPHILS NFR BLD AUTO: 1 %
BILIRUB SERPL-MCNC: 0.6 MG/DL (ref 0–1.2)
BILIRUB UR STRIP.AUTO-MCNC: NEGATIVE MG/DL
BUN SERPL-MCNC: 13 MG/DL (ref 6–23)
CALCIUM SERPL-MCNC: 9.2 MG/DL (ref 8.6–10.6)
CHLORIDE SERPL-SCNC: 104 MMOL/L (ref 98–107)
CHOLEST SERPL-MCNC: 195 MG/DL (ref 0–199)
CHOLESTEROL/HDL RATIO: 2.7
CO2 SERPL-SCNC: 26 MMOL/L (ref 21–32)
COLOR UR: COLORLESS
CREAT SERPL-MCNC: 0.71 MG/DL (ref 0.5–1.05)
CRP SERPL HS-MCNC: 1 MG/L
EGFRCR SERPLBLD CKD-EPI 2021: >90 ML/MIN/1.73M*2
EOSINOPHIL # BLD AUTO: 0.19 X10*3/UL (ref 0–0.7)
EOSINOPHIL NFR BLD AUTO: 2.7 %
ERYTHROCYTE [DISTWIDTH] IN BLOOD BY AUTOMATED COUNT: 12.7 % (ref 11.5–14.5)
EST. AVERAGE GLUCOSE BLD GHB EST-MCNC: 97 MG/DL
GLUCOSE SERPL-MCNC: 79 MG/DL (ref 74–99)
GLUCOSE UR STRIP.AUTO-MCNC: NORMAL MG/DL
HBA1C MFR BLD: 5 %
HCT VFR BLD AUTO: 45.3 % (ref 36–46)
HDLC SERPL-MCNC: 73.4 MG/DL
HGB BLD-MCNC: 14.9 G/DL (ref 12–16)
IMM GRANULOCYTES # BLD AUTO: 0.01 X10*3/UL (ref 0–0.7)
IMM GRANULOCYTES NFR BLD AUTO: 0.1 % (ref 0–0.9)
KETONES UR STRIP.AUTO-MCNC: NEGATIVE MG/DL
LDLC SERPL CALC-MCNC: 114 MG/DL
LEUKOCYTE ESTERASE UR QL STRIP.AUTO: ABNORMAL
LYMPHOCYTES # BLD AUTO: 2.01 X10*3/UL (ref 1.2–4.8)
LYMPHOCYTES NFR BLD AUTO: 28.1 %
MCH RBC QN AUTO: 29.4 PG (ref 26–34)
MCHC RBC AUTO-ENTMCNC: 32.9 G/DL (ref 32–36)
MCV RBC AUTO: 89 FL (ref 80–100)
MONOCYTES # BLD AUTO: 0.46 X10*3/UL (ref 0.1–1)
MONOCYTES NFR BLD AUTO: 6.4 %
MUCOUS THREADS #/AREA URNS AUTO: NORMAL /LPF
NEUTROPHILS # BLD AUTO: 4.41 X10*3/UL (ref 1.2–7.7)
NEUTROPHILS NFR BLD AUTO: 61.7 %
NITRITE UR QL STRIP.AUTO: NEGATIVE
NON HDL CHOLESTEROL: 122 MG/DL (ref 0–149)
NRBC BLD-RTO: 0 /100 WBCS (ref 0–0)
PH UR STRIP.AUTO: 6 [PH]
PLATELET # BLD AUTO: 275 X10*3/UL (ref 150–450)
POTASSIUM SERPL-SCNC: 4.3 MMOL/L (ref 3.5–5.3)
PROT SERPL-MCNC: 7.5 G/DL (ref 6.4–8.2)
PROT UR STRIP.AUTO-MCNC: NEGATIVE MG/DL
RBC # BLD AUTO: 5.07 X10*6/UL (ref 4–5.2)
RBC # UR STRIP.AUTO: NEGATIVE /UL
RBC #/AREA URNS AUTO: NORMAL /HPF
SODIUM SERPL-SCNC: 139 MMOL/L (ref 136–145)
SP GR UR STRIP.AUTO: 1
SQUAMOUS #/AREA URNS AUTO: NORMAL /HPF
TRIGL SERPL-MCNC: 39 MG/DL (ref 0–149)
TSH SERPL-ACNC: 1.27 MIU/L (ref 0.44–3.98)
UROBILINOGEN UR STRIP.AUTO-MCNC: NORMAL MG/DL
VLDL: 8 MG/DL (ref 0–40)
WBC # BLD AUTO: 7.2 X10*3/UL (ref 4.4–11.3)
WBC #/AREA URNS AUTO: NORMAL /HPF

## 2024-11-06 PROCEDURE — 36415 COLL VENOUS BLD VENIPUNCTURE: CPT

## 2024-11-06 PROCEDURE — 80061 LIPID PANEL: CPT

## 2024-11-06 PROCEDURE — 86141 C-REACTIVE PROTEIN HS: CPT

## 2024-11-06 PROCEDURE — 84443 ASSAY THYROID STIM HORMONE: CPT

## 2024-11-06 PROCEDURE — 83036 HEMOGLOBIN GLYCOSYLATED A1C: CPT

## 2024-11-06 PROCEDURE — 86706 HEP B SURFACE ANTIBODY: CPT

## 2024-11-06 PROCEDURE — 85025 COMPLETE CBC W/AUTO DIFF WBC: CPT

## 2024-11-06 PROCEDURE — 80053 COMPREHEN METABOLIC PANEL: CPT

## 2024-11-06 PROCEDURE — 81001 URINALYSIS AUTO W/SCOPE: CPT

## 2024-11-06 PROCEDURE — 82306 VITAMIN D 25 HYDROXY: CPT

## 2024-11-08 NOTE — PROGRESS NOTES
Physical Exam    Name Berta Galaviz    Date of Service :11/8/2024      Berta Galaviz  63 y.o. is here for an annual physical exam  she has been extraordinarily healthy over time past medical history significant for weight loss and is lost over 100 pounds in 2013 using protein sparing modified fast she regained some of this weight but has been doing intermittent fasting and currently doing very well  She has had some issues with hair loss with the protein sparing modified fast  She has had issues with urinary frequency urgency and incontinence and has been seeing Dr. Silvestre now on estrogen patch which is also helped her menopausal symptoms she also uses vaginal estrogen  Bradycardia during her colonoscopy but we have done Zio patch which looked okay  She has always had low heart rate and is asymptomatic  Heart rate will drop into the 40s noted on her Apple Watch at nighttime  Zio patch did show heart rate as low as 39 there was episode of ventricular tachycardia but brief as well as supraventricular which she really does not think she was aware of perhaps 1 episode  She has been hospitalized for childbirth x2 had plastic surgery at age 19 for her nose she did have a uterine ablation around 2010  Overall she is feeling well  She has been able to maintain her weight  She has had hair loss taking topical Rogaine supplements has spoken with Dr. Issa in the past as he has a close friend    Also had some intermittent orthopedic issues including knee and foot pain  Last colonoscopy 2023  3 year repeat   Last mammogram October 2024  Up-to-date with immunizations including COVID-19 and flu    A few weeks ago she did have a syncopal episode had had a few these in the past she thinks this was probably more vasovagal but  She had eaten that day she had a couple drinks but nothing excessive they walked up a flight of steps and she felt like her heart rate was racing and did not feel great and then passed out probably for  about a minute no loss of bowel or bladder function knew where she was  EMS was called they did an EKG which was fine she refused transport to ER has had no further episodes.  She is not a good water drinker she knows    Past Medical History:   Diagnosis Date    Other specified health status 2015    No pertinent past medical history       Past Surgical History:   Procedure Laterality Date    ADENOIDECTOMY  2015    Adenoidectomy     SECTION, CLASSIC  ,      Section    OTHER SURGICAL HISTORY      endometrial ablation    RHINOPLASTY  2015    Rhinoplasty    TONSILLECTOMY  2015    Tonsillectomy        Social History     Tobacco Use    Smoking status: Never    Smokeless tobacco: Never   Vaping Use    Vaping status: Never Used        Social History     Social History Narrative    Is originally from  [place] 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 [employer] moved to [place] planning to go back to [facility] but met her  here in [place] has been here since    She is  to Miguel     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 [facility]    2 daughters ages 31 and 29 who have just been  recently 1 in [place] 1 in [place]    Diet is healthy doing more of intermittent fasting    Exercise she has been walking on a daily basis and does resistance training about 1 time weekly with a     She has never been a smoker    Occasional alcohol            Increased stress with aging parents who live in [place]       Family History   Problem Relation Name Age of Onset    Coronary artery disease Mother  75    Polycythemia Mother      Ulcerative colitis Father      Other (spiansl stenosis) Father      No Known Problems Sister      Other (djd) Brother      No Known Problems Brother      Ovarian cancer Mother's Sister      Breast cancer Mother's Sister          There were no vitals taken for this visit.    Physical Exam  Physical  examination  Reveals a well-developed woman in no acute distress    appearance is age-appropriate  HEENT exam  Thinning hair noted  Extraocular motion is intact  Tympanic membranes and external auditory canals are normal  Oropharynx is normal  There is no cervical lymphadenopathy appreciated  The thyroid is within normal limits    Lungs    clear to auscultation and percussion    Cardiovascular   regular rate and rhythm  No murmurs rubs or gallops are appreciated    Breast examination   No dominant masses nipple discharge or axillary lymphadenopathy is appreciated    Abdomen   soft nontender bowel sounds are positive   there is no organomegaly noted      Periphery  Pulses are present without deficits noted  No peripheral edema is noted    Musculoskeletal  Gait is normal  Is no joint erythema or swelling noted  Range of motion is within normal limits  Strength is 5 of 5 without deficits noted    Dermatology  No concerning skin lesions are noted    Neurology  No deficits are noted  Judgment appears appropriate  Mood and affect are appropriate         Results from last 7 days   Lab Units 11/06/24  1042   WBC AUTO x10*3/uL 7.2   HEMOGLOBIN g/dL 14.9   HEMATOCRIT % 45.3   PLATELETS AUTO x10*3/uL 275   NEUTROS PCT AUTO % 61.7   LYMPHS PCT AUTO % 28.1   MONOS PCT AUTO % 6.4   EOS PCT AUTO % 2.7      Results from last 7 days   Lab Units 11/06/24  1042   HEMOGLOBIN A1C % 5.0     Results from last 7 days   Lab Units 11/06/24  1042   SODIUM mmol/L 139   POTASSIUM mmol/L 4.3   CHLORIDE mmol/L 104   CO2 mmol/L 26   BUN mg/dL 13   CREATININE mg/dL 0.71   CALCIUM mg/dL 9.2   PROTEIN TOTAL g/dL 7.5   BILIRUBIN TOTAL mg/dL 0.6   ALK PHOS U/L 46   ALT U/L 15   AST U/L 19   GLUCOSE mg/dL 79      Results from last 7 days   Lab Units 11/06/24  1042   CHOLESTEROL mg/dL 195   TRIGLYCERIDES mg/dL 39   HDL mg/dL 73.4           Results from last 7 days   Lab Units 11/06/24  1042   TSH mIU/L 1.27     Results from last 7 days   Lab Units  "11/06/24  1042   PROTEIN U mg/dL NEGATIVE     No components found for: \"UA\"  Lab on 11/06/2024   Component Date Value Ref Range Status    Cholesterol 11/06/2024 195  0 - 199 mg/dL Final          Age      Desirable   Borderline High   High     0-19 Y     0 - 169       170 - 199     >/= 200    20-24 Y     0 - 189       190 - 224     >/= 225         >24 Y     0 - 199       200 - 239     >/= 240   **All ranges are based on fasting samples. Specific   therapeutic targets will vary based on patient-specific   cardiac risk.    Pediatric guidelines reference:Pediatrics 2011, 128(S5).Adult guidelines reference: NCEP ATPIII Guidelines,WILMER 2001, 258:2486-97    Venipuncture immediately after or during the administration of Metamizole may lead to falsely low results. Testing should be performed immediately prior to Metamizole dosing.    HDL-Cholesterol 11/06/2024 73.4  mg/dL Final      Age       Very Low   Low     Normal    High    0-19 Y    < 35      < 40     40-45     ----  20-24 Y    ----     < 40      >45      ----        >24 Y      ----     < 40     40-60      >60      Cholesterol/HDL Ratio 11/06/2024 2.7   Final      Ref Values  Desirable  < 3.4  High Risk  > 5.0    LDL Calculated 11/06/2024 114 (H)  <=99 mg/dL Final                                Near   Borderline      AGE      Desirable  Optimal    High     High     Very High     0-19 Y     0 - 109     ---    110-129   >/= 130     ----    20-24 Y     0 - 119     ---    120-159   >/= 160     ----      >24 Y     0 -  99   100-129  130-159   160-189     >/=190      VLDL 11/06/2024 8  0 - 40 mg/dL Final    Triglycerides 11/06/2024 39  0 - 149 mg/dL Final    Age              Desirable        Borderline         High        Very High  SEX:B           mg/dL             mg/dL               mg/dL      mg/dL  <=14D                       ----               ----        ----  15D-365D                    ----               ----        ----  1Y-9Y           0-74             "   75-99             >=100       ----  10Y-19Y        0-89                            >=130       ----  20Y-24Y        0-114             115-149             >=150      ----  >= 25Y         0-149             150-199             200-499    >=500      Venipuncture immediately after or during the administration of Metamizole may lead to falsely low results. Testing should be performed immediately prior to Metamizole dosing.    Non HDL Cholesterol 11/06/2024 122  0 - 149 mg/dL Final          Age       Desirable   Borderline High   High     Very High     0-19 Y     0 - 119       120 - 144     >/= 145    >/= 160    20-24 Y     0 - 149       150 - 189     >/= 190      ----         >24 Y    30 mg/dL above LDL Cholesterol goal      Glucose 11/06/2024 79  74 - 99 mg/dL Final    Sodium 11/06/2024 139  136 - 145 mmol/L Final    Potassium 11/06/2024 4.3  3.5 - 5.3 mmol/L Final    Chloride 11/06/2024 104  98 - 107 mmol/L Final    Bicarbonate 11/06/2024 26  21 - 32 mmol/L Final    Anion Gap 11/06/2024 13  10 - 20 mmol/L Final    Urea Nitrogen 11/06/2024 13  6 - 23 mg/dL Final    Creatinine 11/06/2024 0.71  0.50 - 1.05 mg/dL Final    eGFR 11/06/2024 >90  >60 mL/min/1.73m*2 Final    Calculations of estimated GFR are performed using the 2021 CKD-EPI Study Refit equation without the race variable for the IDMS-Traceable creatinine methods.  https://jasn.asnjournals.org/content/early/2021/09/22/ASN.3338231588    Calcium 11/06/2024 9.2  8.6 - 10.6 mg/dL Final    Albumin 11/06/2024 4.2  3.4 - 5.0 g/dL Final    Alkaline Phosphatase 11/06/2024 46  33 - 136 U/L Final    Total Protein 11/06/2024 7.5  6.4 - 8.2 g/dL Final    AST 11/06/2024 19  9 - 39 U/L Final    Bilirubin, Total 11/06/2024 0.6  0.0 - 1.2 mg/dL Final    ALT 11/06/2024 15  7 - 45 U/L Final    Patients treated with Sulfasalazine may generate falsely decreased results for ALT.    Thyroid Stimulating Hormone 11/06/2024 1.27  0.44 - 3.98 mIU/L Final    Color, Urine 11/06/2024  Colorless (N)  Light-Yellow, Yellow, Dark-Yellow Final    Appearance, Urine 11/06/2024 Clear  Clear Final    Specific Gravity, Urine 11/06/2024 1.003 (N)  1.005 - 1.035 Final    pH, Urine 11/06/2024 6.0  5.0, 5.5, 6.0, 6.5, 7.0, 7.5, 8.0 Final    Protein, Urine 11/06/2024 NEGATIVE  NEGATIVE, 10 (TRACE), 20 (TRACE) mg/dL Final    Glucose, Urine 11/06/2024 Normal  Normal mg/dL Final    Blood, Urine 11/06/2024 NEGATIVE  NEGATIVE Final    Ketones, Urine 11/06/2024 NEGATIVE  NEGATIVE mg/dL Final    Bilirubin, Urine 11/06/2024 NEGATIVE  NEGATIVE Final    Urobilinogen, Urine 11/06/2024 Normal  Normal mg/dL Final    Nitrite, Urine 11/06/2024 NEGATIVE  NEGATIVE Final    Leukocyte Esterase, Urine 11/06/2024 75 Antelmo/µL (A)  NEGATIVE Final    Vitamin D, 25-Hydroxy, Total 11/06/2024 34  30 - 100 ng/mL Final    Hemoglobin A1C 11/06/2024 5.0  See comment % Final    Estimated Average Glucose 11/06/2024 97  Not Established mg/dL Final    WBC 11/06/2024 7.2  4.4 - 11.3 x10*3/uL Final    nRBC 11/06/2024 0.0  0.0 - 0.0 /100 WBCs Final    RBC 11/06/2024 5.07  4.00 - 5.20 x10*6/uL Final    Hemoglobin 11/06/2024 14.9  12.0 - 16.0 g/dL Final    Hematocrit 11/06/2024 45.3  36.0 - 46.0 % Final    MCV 11/06/2024 89  80 - 100 fL Final    MCH 11/06/2024 29.4  26.0 - 34.0 pg Final    MCHC 11/06/2024 32.9  32.0 - 36.0 g/dL Final    RDW 11/06/2024 12.7  11.5 - 14.5 % Final    Platelets 11/06/2024 275  150 - 450 x10*3/uL Final    Neutrophils % 11/06/2024 61.7  40.0 - 80.0 % Final    Immature Granulocytes %, Automated 11/06/2024 0.1  0.0 - 0.9 % Final    Immature Granulocyte Count (IG) includes promyelocytes, myelocytes and metamyelocytes but does not include bands. Percent differential counts (%) should be interpreted in the context of the absolute cell counts (cells/UL).    Lymphocytes % 11/06/2024 28.1  13.0 - 44.0 % Final    Monocytes % 11/06/2024 6.4  2.0 - 10.0 % Final    Eosinophils % 11/06/2024 2.7  0.0 - 6.0 % Final    Basophils % 11/06/2024  1.0  0.0 - 2.0 % Final    Neutrophils Absolute 11/06/2024 4.41  1.20 - 7.70 x10*3/uL Final    Percent differential counts (%) should be interpreted in the context of the absolute cell counts (cells/uL).    Immature Granulocytes Absolute, Au* 11/06/2024 0.01  0.00 - 0.70 x10*3/uL Final    Lymphocytes Absolute 11/06/2024 2.01  1.20 - 4.80 x10*3/uL Final    Monocytes Absolute 11/06/2024 0.46  0.10 - 1.00 x10*3/uL Final    Eosinophils Absolute 11/06/2024 0.19  0.00 - 0.70 x10*3/uL Final    Basophils Absolute 11/06/2024 0.07  0.00 - 0.10 x10*3/uL Final    CRP, High Sensitivity 11/06/2024 1.0 (H)  <1.0 mg/L Final    WBC, Urine 11/06/2024 1-5  1-5, NONE /HPF Final    RBC, Urine 11/06/2024 1-2  NONE, 1-2, 3-5 /HPF Final    Squamous Epithelial Cells, Urine 11/06/2024 1-9 (SPARSE)  Reference range not established. /HPF Final    Mucus, Urine 11/06/2024 FEW  Reference range not established. /LPF Final     [unfilled]   No results found.   The 10-year ASCVD risk score (Agustina AVITIA, et al., 2019) is: 4.5%    Values used to calculate the score:      Age: 63 years      Sex: Female      Is Non- : No      Diabetic: No      Tobacco smoker: No      Systolic Blood Pressure: 149 mmHg      Is BP treated: No      HDL Cholesterol: 73.4 mg/dL      Total Cholesterol: 195 mg/dL   .  ECG: normal sinus rhythm, no blocks or conduction defects, no ischemic changes.   Sinus bradycardia is noted    Problem List Items Addressed This Visit    None      Assessment/Plan   #1 vaginal dryness certainly improved with her current regimen of estrogen she has been seeing Dr. Silvestre she is up-to-date with her mammograms  2.  Urinary frequency urgency may be secondary to #1 her urine did show some white cells and bacteria we will get a repeat it with a culture  3.  Hair loss continues on minoxidil this really all started when she lost the weight with the protein sparing modified fast  4.  Syncope had recent syncopal episode had not had any  for some time know that we had done a Holter monitor just 1 year ago which was okay sinus bradycardia did have a couple episodes of SVT VT but they were very brief at this point I do not think we need to repeat this but if she would have any further symptoms we would repeat the Holter monitor  5.  Knee pain bilateral seems to be getting a little worse we can refer her to Ortho  6.  Health maintenance  Congratulated her on a very healthy lifestyle  She is up-to-date with immunizations however consider hepatitis B vaccination I have added hepatitis B surface antibody to her blood work  Up-to-date with mammogram  Repeat colonoscopy 2026  Continue routine regular exercise adding more resistance training  Increased water intake is recommended  She is up-to-date with gynecology Dr. Silvestre  Bone density requisition given  7.  Elevated blood pressure I would like her to monitor her blood pressure at home if she is consistently greater than 140/85 need to consider medication

## 2024-11-11 ENCOUNTER — APPOINTMENT (OUTPATIENT)
Dept: PRIMARY CARE | Facility: CLINIC | Age: 64
End: 2024-11-11
Payer: COMMERCIAL

## 2024-11-11 VITALS
HEIGHT: 68 IN | WEIGHT: 174 LBS | OXYGEN SATURATION: 98 % | DIASTOLIC BLOOD PRESSURE: 86 MMHG | BODY MASS INDEX: 26.37 KG/M2 | SYSTOLIC BLOOD PRESSURE: 144 MMHG | HEART RATE: 57 BPM

## 2024-11-11 DIAGNOSIS — Z78.0 ASYMPTOMATIC MENOPAUSAL STATE: ICD-10-CM

## 2024-11-11 DIAGNOSIS — Z00.00 HEALTHCARE MAINTENANCE: ICD-10-CM

## 2024-11-11 DIAGNOSIS — M25.569 CHRONIC KNEE PAIN, UNSPECIFIED LATERALITY: ICD-10-CM

## 2024-11-11 DIAGNOSIS — R55 VASOVAGAL SYNCOPE: ICD-10-CM

## 2024-11-11 DIAGNOSIS — L65.9 HAIR LOSS: Primary | ICD-10-CM

## 2024-11-11 DIAGNOSIS — G89.29 CHRONIC KNEE PAIN, UNSPECIFIED LATERALITY: ICD-10-CM

## 2024-11-11 DIAGNOSIS — R35.0 URINARY FREQUENCY: ICD-10-CM

## 2024-11-11 LAB — HBV SURFACE AB SER-ACNC: <3.1 MIU/ML

## 2024-11-11 PROCEDURE — UHSPHYS PR UH SELECT PHYSICAL: Performed by: INTERNAL MEDICINE

## 2024-11-11 PROCEDURE — 1036F TOBACCO NON-USER: CPT | Performed by: INTERNAL MEDICINE

## 2024-11-11 PROCEDURE — 3008F BODY MASS INDEX DOCD: CPT | Performed by: INTERNAL MEDICINE

## 2024-11-11 PROCEDURE — 93000 ELECTROCARDIOGRAM COMPLETE: CPT | Performed by: INTERNAL MEDICINE

## 2024-11-11 RX ORDER — MINOXIDIL 2.5 MG/1
2.5 TABLET ORAL DAILY
Qty: 30 TABLET | Refills: 11 | Status: SHIPPED | OUTPATIENT
Start: 2024-11-11 | End: 2025-11-11

## 2024-11-11 RX ORDER — LANOLIN ALCOHOL/MO/W.PET/CERES
1000 CREAM (GRAM) TOPICAL DAILY
COMMUNITY

## 2024-11-11 NOTE — PATIENT INSTRUCTIONS
It was good to see you.  You are doing a good job with your overall health care.  Your blood pressure is elevated today.  I would like you to monitor this at home and if you are consistently greater than 140/85 I do think we need to consider medication  At this time I do not think we need to do anything further with your recent syncopal event make sure you are drinking enough water but if you would have another syncopal event or even if you have episodes of feeling that your heart rate is going very fast I think you should consider further workup perhaps repeating the Holter monitor.  Cholesterol is slightly elevated watch diet and exercise  Remainder of blood work is fine  You are up-to-date with mammogram  Up-to-date with colonoscopy repeat 2026  Continue routine regular exercise  Lets do a bone density study  Vitamin D is low normal Take additional Vit D3 2000 units daily  May need hepatitis B vaccine   As we discussed I do think you are a good candidate for Galleri testing.  Call me after the first of the year and we can order this.    I encourage you to stay active and healthy, and to follow these healthy habits:     Try to increase your intake of fish such as salmon and tuna and try to get 2 to 3 servings of fish per week.  Increase your intake of plant-based protein listed here:    Unprocessed nuts, walnuts, or almonds, Nuts and Seeds.      Green vegetables such as Broccoli, Peas, Greens, Spinach    Beans, Chickpeas, & Lentils    Other sources include Potatoes, Quinoa, Seaweed, Soymilk, Tempeh, and Tofu.  Increase food s higher in flavonoids found in black tea, green tea, apples, nuts, citrus fruit, berries, and dark chocolate.  You should avoid fried foods, and sugary or starchy foods and sugary drinks, and avoid saturated fats.    Try not to dine at restaurants frequently and avoid fast food restaurants.      Try to get restful sleep approximately 7-9 hours every night.  Work towards getting 30 minutes of   moderate intensity exercise 4 to 5 days per week.  You should also try to exercise at least one hour per day with light walking.

## 2024-11-12 ENCOUNTER — LAB (OUTPATIENT)
Dept: LAB | Facility: LAB | Age: 64
End: 2024-11-12
Payer: COMMERCIAL

## 2024-11-12 DIAGNOSIS — R35.0 URINARY FREQUENCY: ICD-10-CM

## 2024-11-12 PROCEDURE — 81001 URINALYSIS AUTO W/SCOPE: CPT

## 2024-11-12 PROCEDURE — 87086 URINE CULTURE/COLONY COUNT: CPT

## 2024-11-13 LAB
APPEARANCE UR: CLEAR
BACTERIA #/AREA URNS AUTO: ABNORMAL /HPF
BILIRUB UR STRIP.AUTO-MCNC: NEGATIVE MG/DL
COLOR UR: COLORLESS
GLUCOSE UR STRIP.AUTO-MCNC: NORMAL MG/DL
HOLD SPECIMEN: NORMAL
KETONES UR STRIP.AUTO-MCNC: NEGATIVE MG/DL
LEUKOCYTE ESTERASE UR QL STRIP.AUTO: ABNORMAL
MUCOUS THREADS #/AREA URNS AUTO: ABNORMAL /LPF
NITRITE UR QL STRIP.AUTO: NEGATIVE
PH UR STRIP.AUTO: 5 [PH]
PROT UR STRIP.AUTO-MCNC: NEGATIVE MG/DL
RBC # UR STRIP.AUTO: NEGATIVE /UL
RBC #/AREA URNS AUTO: ABNORMAL /HPF
SP GR UR STRIP.AUTO: 1.01
SQUAMOUS #/AREA URNS AUTO: ABNORMAL /HPF
UROBILINOGEN UR STRIP.AUTO-MCNC: NORMAL MG/DL
WBC #/AREA URNS AUTO: ABNORMAL /HPF

## 2024-11-14 LAB — BACTERIA UR CULT: NORMAL

## 2024-11-18 ENCOUNTER — HOSPITAL ENCOUNTER (OUTPATIENT)
Dept: RADIOLOGY | Facility: HOSPITAL | Age: 64
Discharge: HOME | End: 2024-11-18
Payer: COMMERCIAL

## 2024-11-18 DIAGNOSIS — M25.569 KNEE PAIN, UNSPECIFIED CHRONICITY, UNSPECIFIED LATERALITY: ICD-10-CM

## 2024-11-18 PROCEDURE — 73564 X-RAY EXAM KNEE 4 OR MORE: CPT | Mod: 50

## 2024-11-18 PROCEDURE — 73564 X-RAY EXAM KNEE 4 OR MORE: CPT | Mod: BILATERAL PROCEDURE | Performed by: STUDENT IN AN ORGANIZED HEALTH CARE EDUCATION/TRAINING PROGRAM

## 2024-11-19 NOTE — PROGRESS NOTES
Ruby Bond MD   Adult Reconstruction and Joint Replacement Surgery  Phone: [phone]     Fax: [phone]       Name: Berta Galaviz  Age: 64 y.o.   : 1960   Date of Visit: 2024        INITIAL CONSULTATION    CC: Right knee pain    Clinical History:  This patient presents with 6 years of RIGHT knee pain. They were referred by Dr. Shanelle Boykin.    Patient has tried the following NSAIDs, Activity modification, Physical therapy, Corticosteroid injections , and Xray. Date of last steroid injection: . Patient does occasionally have pain at night. Patient does not report falls related to this problem. Patient is able to walk unlimited blocks. Patient is currently using nothing as assistive device. Primarily complains of anterior pain. Patient has difficulty with descending stairs and prolonged sitting . The pain is significantly impacting their ability to perform activities of daily living. Patient reports no longer able to do activities such as Standing for prolonged periods of time.     Focused History  PMH: Reviewed and PE/DVT: no  PSH: Reviewed , Hip/Knee replacement: no, Hip/Knee surgery: no, Anesthesia complications: no, Spine surgery: no, Surgical infection: no, and Weight loss surgery: no  Meds: Reviewed, Current Anticoagulants: no, Weight loss medication: no, and Current Opioids: no  Allergies: Reviewed  and The patient reports no contraindications or allergies to cephalosporins, aspirin, NSAIDs or opioids, except as noted above.  FH: No family history of any bleeding or clotting disorders.  SHx: Reviewed, Occupation: , Current smoker: no, EtOH intake weekly: 1 to 2 glasses a week, Social support: unk, and Preferred physical activities: Walking up to 5 miles a day  Dental Hx: Last routine cleaning: 2024 and Discussed that all invasive dental work must be completed at least 3 months prior to joint replacement surgery. Patient understands they are to  avoid any invasive dental work 3-6 months post-surgically.   Jehovah´s Witness: no    PROMs/HISTORY   PROMs   [unfilled]    Past Medical History:   Diagnosis Date    Other specified health status 2015    No pertinent past medical history       Past Medical History:   Diagnosis Date    Other specified health status 2015    No pertinent past medical history     Documented in chart and reviewed.     Past Surgical History:   Procedure Laterality Date    ADENOIDECTOMY  2015    Adenoidectomy     SECTION, CLASSIC  ,      Section    OTHER SURGICAL HISTORY      endometrial ablation    RHINOPLASTY  2015    Rhinoplasty    TONSILLECTOMY  2015    Tonsillectomy       Allergies: She is allergic to other.     Medications:  Current Outpatient Medications   Medication Instructions    cholecalciferol (VITAMIN D-3) 50 mcg    cyanocobalamin (VITAMIN B-12) 1,000 mcg, Daily    estradiol (ESTRACE) 2 g, vaginal, 3 times weekly    estradiol (Vivelle-Dot) 0.05 mg/24 hr patch 1 patch, transdermal, 2 times weekly    minoxidil (LONITEN) 2.5 mg, oral, Daily    progesterone (PROMETRIUM) 100 mg, oral, Nightly       Family History   Problem Relation Name Age of Onset    Coronary artery disease Mother  75    Polycythemia Mother      Myelodysplastic syndrome Mother      Ulcerative colitis Father      Other (spiansl stenosis) Father      Atrial fibrillation Father      No Known Problems Sister      Other (djd) Brother      No Known Problems Brother      Ovarian cancer Mother's Sister      Breast cancer Mother's Sister  70     Documented in chart and reviewed.     Social History     Tobacco Use    Smoking status: Never    Smokeless tobacco: Never   Substance Use Topics    Alcohol use: Not on file        Review of Systems: Review of systems completed with medical assistant intake. Please refer to this note.     Physical Exam:  BMI: 27.    General: The patient is well appearing and has an appropriate  affect.     Neurological Examination: SILT in SPN/DPN/Sural/Saphenous/Tibial nerves. 5/5 EHL, FHL, Tibial anterior, Gastrocnemius. Coordination grossly intact.     Cardiovascular Exam: Capillary refill <2 seconds.     Lymphatic Examination: There is no obvious lymphatic swelling present around the involved joint.    Skin Exam: Skin around the pertinent joint is without evidence of infection or rash.    Gait: The patient ambulates with an antalgic gait.     Lumbar spine:    No tenderness to palpation midline.    Negative straight leg raise bilaterally.    Right Hip Examination:  The skin is intact over the hip.    There is no tenderness over the greater trochanter.    Range of motion is full extension to 100 degrees of flexion.    The hip is stable without subluxation or dislocation.    The hip internally rotates to 15 degrees and externally rotates to 45 degrees.    There is no pain with hip motion.    Left Hip Examination:  The skin is intact over the hip.    There is no tenderness over the greater trochanter.    Range of motion is full extension to 100 degrees of flexion.    The hip is stable without subluxation or dislocation.    The hip internally rotates to 15 degrees and externally rotates to 45 degrees.    There is no pain with hip motion.    Right Knee Examination:  Examination of the knee reveals the skin to be intact.    There is a moderate effusion in the knee.    The alignment of the knee is Valgus.    This deformity is partially correctable.    There is tenderness to palpation over the joint line.    There is moderate quadriceps atrophy.    Range of Motion: 5 to 110 degrees of flexion.    The knee is stable to varus-valgus stress and anterior-posterior stress.     There is moderate grinding with range of motion.    There is severe patellofemoral crepitus.    Left Knee Examination:  Examination of the knee reveals the skin to be intact.    There is a small effusion in the knee.    The alignment of the  "knee is Valgus.    This deformity is correctable.    There is mild tenderness to palpation over the joint line.    There is moderate quadriceps atrophy.    Range of Motion: 5 to 110 degrees of flexion.    The knee is stable to varus-valgus stress and anterior-posterior stress.     There is mild grinding with range of motion.    There is mild patellofemoral crepitus.    Prior Labs:   Prior Labs:   Lab Results   Component Value Date    WBC 7.2 11/06/2024    HGB 14.9 11/06/2024    HCT 45.3 11/06/2024    MCV 89 11/06/2024     11/06/2024      No results found for: \"INR\", \"PROTIME\"      Lab Results   Component Value Date    GLUCOSE 79 11/06/2024    CALCIUM 9.2 11/06/2024     11/06/2024    K 4.3 11/06/2024    CO2 26 11/06/2024     11/06/2024    BUN 13 11/06/2024    CREATININE 0.71 11/06/2024      No results found for: \"CKTOTAL\", \"CKMB\", \"CKMBINDEX\", \"TROPONINI\"   Lab Results   Component Value Date    HGBA1C 5.0 11/06/2024         No results found for: \"CRP\"   No results found for: \"SEDRATE\"      Radiographs:  Radiographs were personally reviewed today. There is evidence of early severe RIGHT  knee osteoarthritis with near LATERAL  bone on bone apposition.    Impression:  This patient presents with early severe RIGHT  knee osteoarthritis with near bone on bone apposition. The patient is not a candidate for surgery at this time.    Diagnosis:  Primary osteoarthritis of right knee    Knee pain, unspecified chronicity, unspecified laterality    Chronic knee pain, unspecified laterality     Recommendations / Plan:    I have discussed the options in detail with the patient. We have discussed anti-inflammatory medication, activity modification, physical therapy, corticosteroid injections, viscosupplementation injections, partial knee replacement surgery and total knee replacement surgery. The patient has not yet exhausted all conservative treatment measures.    The risks and benefits of all these treatment " options have been discussed in detail.     A physical therapy prescription was ordered for the patient.  Patient will continue their home exercise program. Strategies for pain management using over-the-counter anti-inflammatory medications reviewed -Advil is currently efficacious.  Discussed the potential for meloxicam in the near future if pain persists or worsens.  Discussed the utility of steroid injection and she plans to return to get this done next month prior to a trip to [place].  Discussed the utility of a knee brace, specifically an  versus a less constrained version, though we will defer today.  Encouraged them to maintain range of motion and strength around the knee joints.  They will continue to implement these strategies in addressing their pain.       Recommend the patient continue optimizing nonsurgical treatment interventions as outlined above for management of their knee arthritis.  I would be happy to see them again at any point to discuss surgery if they are more optimized or to review progress with nonsurgical treatment of arthritis. The patient verbalizes understanding with the recommendations and treatment plan as outlined above and is in agreement.  Questions were addressed.    _____________  Ruby Bond MD   Attending Orthopaedic Surgeon  [facility]    [facility]       This office note was transcribed with dictation software.  Please excuse any typographical errors, program misunderstandings leading to inadvertent insertions or deletions of inappropriate wording, pronoun errors and other unintentional transcription errors not noticed on proof-reading.

## 2024-11-20 ENCOUNTER — OFFICE VISIT (OUTPATIENT)
Dept: ORTHOPEDIC SURGERY | Facility: HOSPITAL | Age: 64
End: 2024-11-20
Payer: COMMERCIAL

## 2024-11-20 DIAGNOSIS — M25.569 KNEE PAIN, UNSPECIFIED CHRONICITY, UNSPECIFIED LATERALITY: ICD-10-CM

## 2024-11-20 DIAGNOSIS — M17.11 PRIMARY OSTEOARTHRITIS OF RIGHT KNEE: Primary | ICD-10-CM

## 2024-11-20 DIAGNOSIS — G89.29 CHRONIC KNEE PAIN, UNSPECIFIED LATERALITY: ICD-10-CM

## 2024-11-20 DIAGNOSIS — M25.569 CHRONIC KNEE PAIN, UNSPECIFIED LATERALITY: ICD-10-CM

## 2024-11-20 PROCEDURE — 99214 OFFICE O/P EST MOD 30 MIN: CPT | Performed by: STUDENT IN AN ORGANIZED HEALTH CARE EDUCATION/TRAINING PROGRAM

## 2024-11-20 PROCEDURE — 99204 OFFICE O/P NEW MOD 45 MIN: CPT | Performed by: STUDENT IN AN ORGANIZED HEALTH CARE EDUCATION/TRAINING PROGRAM

## 2024-11-20 NOTE — LETTER
2024     Shanelle Boykin MD  [address]  [place] [address]    Patient: Berta Galaviz   YOB: 1960   Date of Visit: 2024       Dear Dr. Shanelle Boykin MD:    Thank you for referring Berta Galaviz to me for evaluation. Below are my notes for this consultation.  If you have questions, please do not hesitate to call me. I look forward to following your patient along with you.       Sincerely,     Ruby Bond MD      CC: No Recipients  ______________________________________________________________________________________     Ruby Bond MD   Adult Reconstruction and Joint Replacement Surgery  Phone: [phone]     Fax: [phone]       Name: Berta Galaviz  Age: 64 y.o.   : 1960   Date of Visit: 2024        INITIAL CONSULTATION    CC: Right knee pain    Clinical History:  This patient presents with 6 years of RIGHT knee pain. They were referred by Dr. Shanelle Boykin.    Patient has tried the following NSAIDs, Activity modification, Physical therapy, Corticosteroid injections , and Xray. Date of last steroid injection: . Patient does occasionally have pain at night. Patient does not report falls related to this problem. Patient is able to walk unlimited blocks. Patient is currently using nothing as assistive device. Primarily complains of anterior pain. Patient has difficulty with descending stairs and prolonged sitting . The pain is significantly impacting their ability to perform activities of daily living. Patient reports no longer able to do activities such as Standing for prolonged periods of time.     Focused History  PMH: Reviewed and PE/DVT: no  PSH: Reviewed , Hip/Knee replacement: no, Hip/Knee surgery: no, Anesthesia complications: no, Spine surgery: no, Surgical infection: no, and Weight loss surgery: no  Meds: Reviewed, Current Anticoagulants: no, Weight loss medication: no, and Current Opioids:  no  Allergies: Reviewed  and The patient reports no contraindications or allergies to cephalosporins, aspirin, NSAIDs or opioids, except as noted above.  FH: No family history of any bleeding or clotting disorders.  SHx: Reviewed, Occupation: , Current smoker: no, EtOH intake weekly: 1 to 2 glasses a week, Social support: unk, and Preferred physical activities: Walking up to 5 miles a day  Dental Hx: Last routine cleaning: 2024 and Discussed that all invasive dental work must be completed at least 3 months prior to joint replacement surgery. Patient understands they are to avoid any invasive dental work 3-6 months post-surgically.   Jehovah´s Witness: no    PROMs/HISTORY   PROMs   [unfilled]    Past Medical History:   Diagnosis Date   • Other specified health status 2015    No pertinent past medical history       Past Medical History:   Diagnosis Date   • Other specified health status 2015    No pertinent past medical history     Documented in chart and reviewed.     Past Surgical History:   Procedure Laterality Date   • ADENOIDECTOMY  2015    Adenoidectomy   •  SECTION, CLASSIC  ,      Section   • OTHER SURGICAL HISTORY      endometrial ablation   • RHINOPLASTY  2015    Rhinoplasty   • TONSILLECTOMY  2015    Tonsillectomy       Allergies: She is allergic to other.     Medications:  Current Outpatient Medications   Medication Instructions   • cholecalciferol (VITAMIN D-3) 50 mcg   • cyanocobalamin (VITAMIN B-12) 1,000 mcg, Daily   • estradiol (ESTRACE) 2 g, vaginal, 3 times weekly   • estradiol (Vivelle-Dot) 0.05 mg/24 hr patch 1 patch, transdermal, 2 times weekly   • minoxidil (LONITEN) 2.5 mg, oral, Daily   • progesterone (PROMETRIUM) 100 mg, oral, Nightly       Family History   Problem Relation Name Age of Onset   • Coronary artery disease Mother  75   • Polycythemia Mother     • Myelodysplastic syndrome Mother     • Ulcerative colitis  Father     • Other (spiansl stenosis) Father     • Atrial fibrillation Father     • No Known Problems Sister     • Other (djd) Brother     • No Known Problems Brother     • Ovarian cancer Mother's Sister     • Breast cancer Mother's Sister  70     Documented in chart and reviewed.     Social History     Tobacco Use   • Smoking status: Never   • Smokeless tobacco: Never   Substance Use Topics   • Alcohol use: Not on file        Review of Systems: Review of systems completed with medical assistant intake. Please refer to this note.     Physical Exam:  BMI: 27.    General: The patient is well appearing and has an appropriate affect.     Neurological Examination: SILT in SPN/DPN/Sural/Saphenous/Tibial nerves. 5/5 EHL, FHL, Tibial anterior, Gastrocnemius. Coordination grossly intact.     Cardiovascular Exam: Capillary refill <2 seconds.     Lymphatic Examination: There is no obvious lymphatic swelling present around the involved joint.    Skin Exam: Skin around the pertinent joint is without evidence of infection or rash.    Gait: The patient ambulates with an antalgic gait.     Lumbar spine:    No tenderness to palpation midline.    Negative straight leg raise bilaterally.    Right Hip Examination:  The skin is intact over the hip.    There is no tenderness over the greater trochanter.    Range of motion is full extension to 100 degrees of flexion.    The hip is stable without subluxation or dislocation.    The hip internally rotates to 15 degrees and externally rotates to 45 degrees.    There is no pain with hip motion.    Left Hip Examination:  The skin is intact over the hip.    There is no tenderness over the greater trochanter.    Range of motion is full extension to 100 degrees of flexion.    The hip is stable without subluxation or dislocation.    The hip internally rotates to 15 degrees and externally rotates to 45 degrees.    There is no pain with hip motion.    Right Knee Examination:  Examination of the knee  "reveals the skin to be intact.    There is a moderate effusion in the knee.    The alignment of the knee is Valgus.    This deformity is partially correctable.    There is tenderness to palpation over the joint line.    There is moderate quadriceps atrophy.    Range of Motion: 5 to 110 degrees of flexion.    The knee is stable to varus-valgus stress and anterior-posterior stress.     There is moderate grinding with range of motion.    There is severe patellofemoral crepitus.    Left Knee Examination:  Examination of the knee reveals the skin to be intact.    There is a small effusion in the knee.    The alignment of the knee is Valgus.    This deformity is correctable.    There is mild tenderness to palpation over the joint line.    There is moderate quadriceps atrophy.    Range of Motion: 5 to 110 degrees of flexion.    The knee is stable to varus-valgus stress and anterior-posterior stress.     There is mild grinding with range of motion.    There is mild patellofemoral crepitus.    Prior Labs:   Prior Labs:   Lab Results   Component Value Date    WBC 7.2 11/06/2024    HGB 14.9 11/06/2024    HCT 45.3 11/06/2024    MCV 89 11/06/2024     11/06/2024      No results found for: \"INR\", \"PROTIME\"      Lab Results   Component Value Date    GLUCOSE 79 11/06/2024    CALCIUM 9.2 11/06/2024     11/06/2024    K 4.3 11/06/2024    CO2 26 11/06/2024     11/06/2024    BUN 13 11/06/2024    CREATININE 0.71 11/06/2024      No results found for: \"CKTOTAL\", \"CKMB\", \"CKMBINDEX\", \"TROPONINI\"   Lab Results   Component Value Date    HGBA1C 5.0 11/06/2024         No results found for: \"CRP\"   No results found for: \"SEDRATE\"      Radiographs:  Radiographs were personally reviewed today. There is evidence of early severe RIGHT  knee osteoarthritis with near LATERAL  bone on bone apposition.    Impression:  This patient presents with early severe RIGHT  knee osteoarthritis with near bone on bone apposition. The patient is " not a candidate for surgery at this time.    Diagnosis:  Primary osteoarthritis of right knee    Knee pain, unspecified chronicity, unspecified laterality    Chronic knee pain, unspecified laterality     Recommendations / Plan:    I have discussed the options in detail with the patient. We have discussed anti-inflammatory medication, activity modification, physical therapy, corticosteroid injections, viscosupplementation injections, partial knee replacement surgery and total knee replacement surgery. The patient has not yet exhausted all conservative treatment measures.    The risks and benefits of all these treatment options have been discussed in detail.     A physical therapy prescription was ordered for the patient.  Patient will continue their home exercise program. Strategies for pain management using over-the-counter anti-inflammatory medications reviewed -Advil is currently efficacious.  Discussed the potential for meloxicam in the near future if pain persists or worsens.  Discussed the utility of steroid injection and she plans to return to get this done next month prior to a trip to [place].  Discussed the utility of a knee brace, specifically an  versus a less constrained version, though we will defer today.  Encouraged them to maintain range of motion and strength around the knee joints.  They will continue to implement these strategies in addressing their pain.       Recommend the patient continue optimizing nonsurgical treatment interventions as outlined above for management of their knee arthritis.  I would be happy to see them again at any point to discuss surgery if they are more optimized or to review progress with nonsurgical treatment of arthritis. The patient verbalizes understanding with the recommendations and treatment plan as outlined above and is in agreement.  Questions were addressed.    _____________  Ruby Bond MD   Attending Orthopaedic Surgeon  [facility]    [facility]       This office note was transcribed with dictation software.  Please excuse any typographical errors, program misunderstandings leading to inadvertent insertions or deletions of inappropriate wording, pronoun errors and other unintentional transcription errors not noticed on proof-reading.

## 2024-12-07 NOTE — PROGRESS NOTES
Ruby Bond MD   Adult Reconstruction and Joint Replacement Surgery  Phone: [phone]     Fax: [phone]       Name: Berta Galaviz  Age: 64 y.o.   : 1960   Date of Visit: 2024    Follow-up Knee    CC: Follow-up RIGHT knee     History of Present Illness:  This patient presents with 6 years of RIGHT knee pain.     They were last seen for this problem on 2024. At the last visit, the patient determined she would like to have a steroid injection but wanted to delay prior to upcoming travel. The patient reports she would like an injection to the RIGHT knee today.    Patient has tried the following NSAIDs, Activity modification, Physical therapy, Corticosteroid injections , and Xray. Date of last steroid injection: . Patient does occasionally have pain at night. Patient does not report falls related to this problem. Patient is able to walk unlimited blocks. Patient is currently using nothing as assistive device. Primarily complains of anterior pain. Patient has difficulty with descending stairs and prolonged sitting . The pain is significantly impacting their ability to perform activities of daily living. Patient reports no longer able to do activities such as Standing for prolonged periods of time.      Focused History  PMH: Reviewed and PE/DVT: no  PSH: Reviewed , Hip/Knee replacement: no, Hip/Knee surgery: no, Anesthesia complications: no, Spine surgery: no, Surgical infection: no, and Weight loss surgery: no  Meds: Reviewed, Current Anticoagulants: no, Weight loss medication: no, and Current Opioids: no  Allergies: Reviewed  and The patient reports no contraindications or allergies to cephalosporins, aspirin, NSAIDs or opioids, except as noted above.  FH: No family history of any bleeding or clotting disorders.  SHx: Reviewed, Occupation: , Current smoker: no, EtOH intake weekly: 1 to 2 glasses a week, Social support: unk, and Preferred physical activities: Walking up  to 5 miles a day  Dental Hx: Last routine cleaning: November 14, 2024 and Discussed that all invasive dental work must be completed at least 3 months prior to joint replacement surgery. Patient understands they are to avoid any invasive dental work 3-6 months post-surgically.   Jehovah´s Witness: no    HISTORY  PROMs   Promis Adult Short Form V1.0 Global Health    10/12/2023 11:40 AM EDT - Filed by Patient   In general, would you say your health is: Very Good   In general, would you say your quality of life is: Very good   In general, how would you rate your physical health? Very good   In general, how would you rate your mental health, including your mood and your ability to think? Very good   In general, how would you rate your satisfaction with your social activities and relationships? Very Good   In general, please rate how well you carry out your usual social activities and roles. (This includes activities at home, at work and in your community, and responsibilities as a parent, child, spouse, employee, friend, etc.) Very good   To what extent are you able to carry out your everyday physical activities such as walking, climbing stairs, carrying groceries, or moving a chair? Completely   In the past 7 days   How often have you been bothered by emotional problems such as feeling anxious, depressed or irritable? Rarely   How would you rate your fatigue on average? Moderate   How would you rate your pain on average? 4   PROMIS Adult Short Form-Global Health Score (Physical) (range: 16 - 68) 47.7   PROMIS Adult Short Form-Global Health Score (Mental) (range: 21 - 68) 53.3          Past Medical History:   Diagnosis Date    Other specified health status 06/05/2015    No pertinent past medical history       Past Medical History:   Diagnosis Date    Other specified health status 06/05/2015    No pertinent past medical history     Documented in chart and reviewed.     Past Surgical History:   Procedure Laterality Date     ADENOIDECTOMY  2015    Adenoidectomy     SECTION, CLASSIC  ,      Section    OTHER SURGICAL HISTORY      endometrial ablation    RHINOPLASTY  2015    Rhinoplasty    TONSILLECTOMY  2015    Tonsillectomy       Allergies: She is allergic to other.     Medications:  Current Outpatient Medications   Medication Instructions    cholecalciferol (VITAMIN D-3) 50 mcg    cyanocobalamin (VITAMIN B-12) 1,000 mcg, Daily    estradiol (ESTRACE) 2 g, vaginal, 3 times weekly    estradiol (Vivelle-Dot) 0.05 mg/24 hr patch 1 patch, transdermal, 2 times weekly    minoxidil (LONITEN) 2.5 mg, oral, Daily    progesterone (PROMETRIUM) 100 mg, oral, Nightly       Family History   Problem Relation Name Age of Onset    Coronary artery disease Mother  75    Polycythemia Mother      Myelodysplastic syndrome Mother      Ulcerative colitis Father      Other (spiansl stenosis) Father      Atrial fibrillation Father      No Known Problems Sister      Other (djd) Brother      No Known Problems Brother      Ovarian cancer Mother's Sister      Breast cancer Mother's Sister  70     Documented in chart and reviewed.     Social History     Tobacco Use    Smoking status: Never    Smokeless tobacco: Never   Substance Use Topics    Alcohol use: Not on file        Review of Systems: Review of systems completed with medical assistant intake. Please refer to this note.     Physical Exam:  BMI: 27.    General: The patient is well appearing and has an appropriate affect.     Neurological Examination: SILT in SPN/DPN/Sural/Saphenous/Tibial nerves. 5/5 EHL, FHL, Tibial anterior, Gastrocnemius. Coordination grossly intact.     Cardiovascular Exam: Capillary refill <2 seconds.     Lymphatic Examination: There is no obvious lymphatic swelling present around the involved joint.    Skin Exam: Skin around the pertinent joint is without evidence of infection or rash.    Gait: patient ambulates with antalgic gait.    Right Hip  Examination:  The skin is intact over the hip.     There is no tenderness over the greater trochanter.     Range of motion is full extension to 100 degrees of flexion.     The hip is stable without subluxation or dislocation.     The hip internally rotates to 15 degrees and externally rotates to 45 degrees.     There is no pain with hip motion.     Left Hip Examination:  The skin is intact over the hip.     There is no tenderness over the greater trochanter.     Range of motion is full extension to 100 degrees of flexion.     The hip is stable without subluxation or dislocation.     The hip internally rotates to 15 degrees and externally rotates to 45 degrees.     There is no pain with hip motion.     Right Knee Examination:  Examination of the knee reveals the skin to be intact.     There is a moderate effusion in the knee.     The alignment of the knee is Valgus.     This deformity is partially correctable.     There is tenderness to palpation over the joint line.     There is moderate quadriceps atrophy.     Range of Motion: 5 to 110 degrees of flexion.     The knee is stable to varus-valgus stress and anterior-posterior stress.      There is moderate grinding with range of motion.     There is severe patellofemoral crepitus.     Left Knee Examination:  Examination of the knee reveals the skin to be intact.     There is a small effusion in the knee.     The alignment of the knee is Valgus.     This deformity is correctable.     There is mild tenderness to palpation over the joint line.     There is moderate quadriceps atrophy.     Range of Motion: 5 to 110 degrees of flexion.     The knee is stable to varus-valgus stress and anterior-posterior stress.      There is mild grinding with range of motion.     There is mild patellofemoral crepitus.    Imaging:  Radiographs were personally reviewed today. There is evidence of early severe RIGHT  knee osteoarthritis with near LATERAL  bone on bone  apposition.    Impression and Plan:  This patient is here for follow-up evaluation of RIGHT knee.    DIAGNOSIS  Primary osteoarthritis of right knee     I have discussed the options in detail with the patient. We have discussed anti-inflammatory medication, activity modification, physical therapy, corticosteroid injections, viscosupplementation injections, partial knee replacement surgery and total knee replacement surgery.  The patient is responding well to nonsurgical treatment measures.    The risks and benefits of all these treatment options have been discussed in detail.     The patient has tried at least 3 months of the above conservative treatments and continues to have disabling pain, impaired activities of daily living and worsened quality of life.  Reviewed the surgical optimization steps to optimize their chances for a successful joint replacement surgery.      She is maintaining a healthy weight and active lifestyle.    Patient will continue their home exercise program. Strategies for pain management using over-the-counter anti-inflammatory medications reviewed.  The patient was prescribed meloxicam for pain management. The patient elects for a steroid injection, which was provided according to procedure note below. Encouraged them to maintain range of motion and strength around the knee joints.  They will continue to implement these strategies in addressing their pain.      Recommend the patient continue optimizing nonsurgical treatment interventions as outlined above for management of their knee arthritis.  I would be happy to see them again at any point to discuss surgery if they are more optimized or to review progress with nonsurgical treatment of arthritis. The patient verbalizes understanding with the recommendations and treatment plan as outlined above and is in agreement.  Questions were addressed.    RTC: as needed    X-rays at next visit: as needed    Large Joint Injection 00234: Knee  Consent  given by: Patient  Timeout: Immediately prior to procedure the correct patient, procedure, and site was verified. Sterile gloves and semi-sterile technique were used.   Indications: Knee pain and joint swelling.   Location: RIGHT knee  Needle size: 22 G  Approach: Lateral    Medications administered: Please refer to medical assistant note for lot number and expiration date.   Subcutaneous   4 ml of 1% lidocaine     Deep   4 ml of 1% lidocaine   4 mL 0.5% marcaine   2 mL of 40 mg kenalog     Patient tolerance: Dressing applied. Patient tolerated the procedure well with no immediate complications.    L Inj/Asp: R knee on 12/9/2024 7:20 AM  Indications: pain and joint swelling  Details: 22 G needle, lateral approach  Medications: 80 mg triamcinolone acetonide 40 mg/mL; 8 mL lidocaine 10 mg/mL (1 %); 4 mL BUPivacaine HCl 0.5 % (5 mg/mL)  Outcome: tolerated well, no immediate complications  Procedure, treatment alternatives, risks and benefits explained, specific risks discussed. Consent was given by the patient. Immediately prior to procedure a time out was called to verify the correct patient, procedure, equipment, support staff and site/side marked as required. Patient was prepped and draped in the usual sterile fashion.             _____________________  Ruby Bond MD   Attending Orthopaedic Surgeon  [facility]    [facility]    This office note was transcribed with dictation software.  Please excuse any typographical errors, program misunderstandings leading to inadvertent insertions or deletions of inappropriate wording, pronoun errors and other unintentional transcription errors not noticed on proof-reading.

## 2024-12-09 ENCOUNTER — OFFICE VISIT (OUTPATIENT)
Dept: ORTHOPEDIC SURGERY | Facility: HOSPITAL | Age: 64
End: 2024-12-09
Payer: COMMERCIAL

## 2024-12-09 DIAGNOSIS — M17.11 PRIMARY OSTEOARTHRITIS OF RIGHT KNEE: Primary | ICD-10-CM

## 2024-12-09 PROCEDURE — 99214 OFFICE O/P EST MOD 30 MIN: CPT | Performed by: STUDENT IN AN ORGANIZED HEALTH CARE EDUCATION/TRAINING PROGRAM

## 2024-12-09 PROCEDURE — 2500000004 HC RX 250 GENERAL PHARMACY W/ HCPCS (ALT 636 FOR OP/ED): Performed by: STUDENT IN AN ORGANIZED HEALTH CARE EDUCATION/TRAINING PROGRAM

## 2024-12-09 PROCEDURE — 20610 DRAIN/INJ JOINT/BURSA W/O US: CPT | Mod: RT | Performed by: STUDENT IN AN ORGANIZED HEALTH CARE EDUCATION/TRAINING PROGRAM

## 2024-12-09 RX ORDER — MELOXICAM 15 MG/1
15 TABLET ORAL DAILY
Qty: 30 TABLET | Refills: 1 | Status: SHIPPED | OUTPATIENT
Start: 2024-12-09 | End: 2025-01-08

## 2024-12-09 ASSESSMENT — PAIN - FUNCTIONAL ASSESSMENT: PAIN_FUNCTIONAL_ASSESSMENT: 0-10

## 2024-12-09 ASSESSMENT — PAIN DESCRIPTION - DESCRIPTORS: DESCRIPTORS: ACHING;THROBBING

## 2024-12-09 ASSESSMENT — PAIN SCALES - GENERAL: PAINLEVEL_OUTOF10: 5 - MODERATE PAIN

## 2024-12-11 RX ORDER — BUPIVACAINE HYDROCHLORIDE 5 MG/ML
4 INJECTION, SOLUTION PERINEURAL
Status: COMPLETED | OUTPATIENT
Start: 2024-12-09 | End: 2024-12-09

## 2024-12-11 RX ORDER — TRIAMCINOLONE ACETONIDE 40 MG/ML
80 INJECTION, SUSPENSION INTRA-ARTICULAR; INTRAMUSCULAR
Status: COMPLETED | OUTPATIENT
Start: 2024-12-09 | End: 2024-12-09

## 2024-12-11 RX ORDER — LIDOCAINE HYDROCHLORIDE 10 MG/ML
8 INJECTION, SOLUTION INFILTRATION; PERINEURAL
Status: COMPLETED | OUTPATIENT
Start: 2024-12-09 | End: 2024-12-09

## 2025-01-17 ENCOUNTER — HOSPITAL ENCOUNTER (OUTPATIENT)
Dept: RADIOLOGY | Facility: CLINIC | Age: 65
Discharge: HOME | End: 2025-01-17
Payer: COMMERCIAL

## 2025-01-17 DIAGNOSIS — Z78.0 ASYMPTOMATIC MENOPAUSAL STATE: ICD-10-CM

## 2025-01-17 PROCEDURE — 77080 DXA BONE DENSITY AXIAL: CPT | Performed by: RADIOLOGY

## 2025-01-17 PROCEDURE — 77080 DXA BONE DENSITY AXIAL: CPT

## 2025-03-11 NOTE — PROGRESS NOTES
Subjective   Patient ID: Berta Galaviz is a 64 y.o. female.    HPI  Patient presents today for blood pressure check  Her blood pressure had been elevated when I last saw her.  She has  been checking at home   Most values high   but will have 1 in the normal range sometimes she will have an as high as 170 systolic  Overall she is feeling well she is exercising more than she ever has her weight is stable she does have a family history of hypertension  She does have baseline resting bradycardia as well      Past medical history significant for  Urinary frequency urgency now on estrogen patch as well as vaginal estrogen which is really helped  Bradycardia but has had Zio patch which looked okay  Had significant intentional weight loss now over 10 years ago and has really been able to maintain doing well  Hair loss thinning felt at least in part secondary to protein sparing modified fast  DJD      Review of Systems    Objective   Physical Exam  Well-developed no acute distress age-appropriate  HEENT exam noted for some    In the sideburn areas and hair thinning as well  Cardiovascular regular rate and rhythm  Lungs clear to auscultation and percussion  Periphery without edema    Assessment/Plan   #1 hypertension she has been monitoring her blood pressure at home and it has been consistently elevated we will start olmesartan 20 mg daily recheck blood pressure within the next couple of months she does monitor at home as well  2.  Hair loss had been on minoxidil stopped because visit getting hair growth on her face minoxidil may have been helping with her blood pressure a little we could consider spironolactone as well but certainly would be cautious with now starting the olmesartan as well  3.  Health maintenance  Some increased risk of coronary artery disease 10-year risk 4.8% we will obtain coronary artery calcium score  Today a Tdap and Prevnar 20 were given  We discussed MMR status she was born in 1960 has no idea  if she received 2 vaccinations therefore do recommend she receive MMR at the pharmacy         TOKNINA:'[id number]'

## 2025-03-12 ENCOUNTER — OFFICE VISIT (OUTPATIENT)
Dept: PRIMARY CARE | Facility: CLINIC | Age: 65
End: 2025-03-12
Payer: COMMERCIAL

## 2025-03-12 VITALS — HEART RATE: 54 BPM | DIASTOLIC BLOOD PRESSURE: 84 MMHG | SYSTOLIC BLOOD PRESSURE: 144 MMHG | OXYGEN SATURATION: 97 %

## 2025-03-12 DIAGNOSIS — E78.00 HYPERCHOLESTEREMIA: ICD-10-CM

## 2025-03-12 DIAGNOSIS — I10 PRIMARY HYPERTENSION: Primary | ICD-10-CM

## 2025-03-12 PROCEDURE — 1036F TOBACCO NON-USER: CPT | Performed by: INTERNAL MEDICINE

## 2025-03-12 PROCEDURE — 3077F SYST BP >= 140 MM HG: CPT | Performed by: INTERNAL MEDICINE

## 2025-03-12 PROCEDURE — 99213 OFFICE O/P EST LOW 20 MIN: CPT | Performed by: INTERNAL MEDICINE

## 2025-03-12 PROCEDURE — 90715 TDAP VACCINE 7 YRS/> IM: CPT | Performed by: INTERNAL MEDICINE

## 2025-03-12 PROCEDURE — 90471 IMMUNIZATION ADMIN: CPT | Performed by: INTERNAL MEDICINE

## 2025-03-12 PROCEDURE — 3079F DIAST BP 80-89 MM HG: CPT | Performed by: INTERNAL MEDICINE

## 2025-03-12 PROCEDURE — 90472 IMMUNIZATION ADMIN EACH ADD: CPT | Performed by: INTERNAL MEDICINE

## 2025-03-12 PROCEDURE — 90677 PCV20 VACCINE IM: CPT | Performed by: INTERNAL MEDICINE

## 2025-03-12 RX ORDER — OLMESARTAN MEDOXOMIL 20 MG/1
20 TABLET ORAL DAILY
Qty: 30 TABLET | Refills: 5 | Status: SHIPPED | OUTPATIENT
Start: 2025-03-12 | End: 2025-09-08

## 2025-05-05 NOTE — PROGRESS NOTES
Subjective   Patient ID: Berta Galaviz is a 64 y.o. female.    HPI  Patient presents today in follow-up and for blood pressure check she was last seen 3/12/2025 at which point we started olmesartan 20 mg  daily for hypertension her BP has been <130 normally   Yesterday it was a little higher than her baseline values but overall has been good  She did have an episode of vertigo   That seems to have dissipated and it started sometime after she started the medication  Otherwise no issues       Past medical history significant for  Urinary frequency urgency now on estrogen patch as well as vaginal estrogen which is really helped  Bradycardia but has had Zio patch which looked okay  Had significant intentional weight loss now over 10 years ago and has really been able to maintain doing well  Hair loss thinning felt at least in part secondary to protein sparing modified fast  DJD  HTN  Review of Systems    Objective   Physical Exam  Well-developed age-appropriate no acute distress  HEENT exam unremarkable  Cardiovascular regular rate and rhythm  Peripheries without edema    Assessment/Plan   #1 hypertension now in adequate control with her olmesartan we will continue olmesartan at current dose  2.  Vertigo seem to been self-limited do not feel we need to do anything further at this

## 2025-05-07 ENCOUNTER — APPOINTMENT (OUTPATIENT)
Dept: PRIMARY CARE | Facility: CLINIC | Age: 65
End: 2025-05-07
Payer: COMMERCIAL

## 2025-05-07 VITALS — DIASTOLIC BLOOD PRESSURE: 82 MMHG | SYSTOLIC BLOOD PRESSURE: 124 MMHG

## 2025-05-07 DIAGNOSIS — R42 DIZZINESS: Primary | ICD-10-CM

## 2025-05-07 PROBLEM — I10 PRIMARY HYPERTENSION: Status: ACTIVE | Noted: 2025-05-07

## 2025-05-07 PROCEDURE — 99213 OFFICE O/P EST LOW 20 MIN: CPT | Performed by: INTERNAL MEDICINE

## 2025-05-07 PROCEDURE — 3074F SYST BP LT 130 MM HG: CPT | Performed by: INTERNAL MEDICINE

## 2025-05-07 PROCEDURE — 3079F DIAST BP 80-89 MM HG: CPT | Performed by: INTERNAL MEDICINE

## 2025-05-12 ENCOUNTER — HOSPITAL ENCOUNTER (OUTPATIENT)
Dept: RADIOLOGY | Facility: HOSPITAL | Age: 65
Discharge: HOME | End: 2025-05-12
Payer: COMMERCIAL

## 2025-05-12 DIAGNOSIS — E78.00 HYPERCHOLESTEREMIA: ICD-10-CM

## 2025-05-12 PROCEDURE — 75571 CT HRT W/O DYE W/CA TEST: CPT

## 2025-05-14 DIAGNOSIS — I77.810 ASCENDING AORTA DILATATION: Primary | ICD-10-CM

## 2025-05-19 ENCOUNTER — HOSPITAL ENCOUNTER (OUTPATIENT)
Dept: CARDIOLOGY | Facility: HOSPITAL | Age: 65
Discharge: HOME | End: 2025-05-19
Payer: COMMERCIAL

## 2025-05-19 DIAGNOSIS — I77.810 ASCENDING AORTA DILATATION: ICD-10-CM

## 2025-05-19 PROCEDURE — 93306 TTE W/DOPPLER COMPLETE: CPT

## 2025-05-19 PROCEDURE — 93306 TTE W/DOPPLER COMPLETE: CPT | Performed by: INTERNAL MEDICINE

## 2025-05-20 LAB
AORTIC VALVE MEAN GRADIENT: 3 MMHG
AORTIC VALVE PEAK VELOCITY: 1.21 M/S
AV PEAK GRADIENT: 6 MMHG
AVA (PEAK VEL): 3.93 CM2
AVA (VTI): 3.83 CM2
EJECTION FRACTION APICAL 4 CHAMBER: 69.7
EJECTION FRACTION: 62 %
LEFT ATRIUM VOLUME AREA LENGTH INDEX BSA: 34.6 ML/M2
LEFT VENTRICLE INTERNAL DIMENSION DIASTOLE: 4.56 CM (ref 3.5–6)
LEFT VENTRICULAR OUTFLOW TRACT DIAMETER: 2.14 CM
MITRAL VALVE E/A RATIO: 1.01
RIGHT VENTRICLE FREE WALL PEAK S': 16 CM/S
RIGHT VENTRICLE PEAK SYSTOLIC PRESSURE: 22.6 MMHG
TRICUSPID ANNULAR PLANE SYSTOLIC EXCURSION: 2.3 CM

## 2025-05-21 ENCOUNTER — APPOINTMENT (OUTPATIENT)
Dept: ORTHOPEDIC SURGERY | Facility: HOSPITAL | Age: 65
End: 2025-05-21
Payer: COMMERCIAL

## 2025-05-21 DIAGNOSIS — I77.819 AORTIC DILATATION: Primary | ICD-10-CM

## 2025-05-23 ENCOUNTER — APPOINTMENT (OUTPATIENT)
Dept: ORTHOPEDIC SURGERY | Facility: CLINIC | Age: 65
End: 2025-05-23
Payer: COMMERCIAL

## 2025-05-23 DIAGNOSIS — M17.11 PRIMARY OSTEOARTHRITIS OF RIGHT KNEE: Primary | ICD-10-CM

## 2025-05-23 PROCEDURE — 99214 OFFICE O/P EST MOD 30 MIN: CPT | Performed by: STUDENT IN AN ORGANIZED HEALTH CARE EDUCATION/TRAINING PROGRAM

## 2025-05-23 PROCEDURE — 20610 DRAIN/INJ JOINT/BURSA W/O US: CPT | Performed by: STUDENT IN AN ORGANIZED HEALTH CARE EDUCATION/TRAINING PROGRAM

## 2025-05-23 RX ORDER — LIDOCAINE HYDROCHLORIDE 10 MG/ML
8 INJECTION, SOLUTION INFILTRATION; PERINEURAL
Status: COMPLETED | OUTPATIENT
Start: 2025-05-23 | End: 2025-05-23

## 2025-05-23 RX ORDER — TRIAMCINOLONE ACETONIDE 40 MG/ML
40 INJECTION, SUSPENSION INTRA-ARTICULAR; INTRAMUSCULAR
Status: COMPLETED | OUTPATIENT
Start: 2025-05-23 | End: 2025-05-23

## 2025-05-23 RX ORDER — BUPIVACAINE HYDROCHLORIDE 5 MG/ML
4 INJECTION, SOLUTION PERINEURAL
Status: COMPLETED | OUTPATIENT
Start: 2025-05-23 | End: 2025-05-23

## 2025-05-23 RX ADMIN — TRIAMCINOLONE ACETONIDE 40 MG: 40 INJECTION, SUSPENSION INTRA-ARTICULAR; INTRAMUSCULAR at 12:45

## 2025-05-23 RX ADMIN — BUPIVACAINE HYDROCHLORIDE 4 ML: 5 INJECTION, SOLUTION PERINEURAL at 12:45

## 2025-05-23 RX ADMIN — LIDOCAINE HYDROCHLORIDE 8 ML: 10 INJECTION, SOLUTION INFILTRATION; PERINEURAL at 12:45

## 2025-05-23 ASSESSMENT — PAIN SCALES - GENERAL: PAINLEVEL_OUTOF10: 4

## 2025-05-23 ASSESSMENT — PAIN - FUNCTIONAL ASSESSMENT: PAIN_FUNCTIONAL_ASSESSMENT: 0-10

## 2025-05-23 ASSESSMENT — PAIN DESCRIPTION - DESCRIPTORS: DESCRIPTORS: DULL;ACHING

## 2025-05-23 NOTE — PROGRESS NOTES
Ruby Bond MD   Adult Reconstruction and Joint Replacement Surgery  Phone: [phone]     Fax: [phone]       Name: Berta Galaviz  Age: 64 y.o.   : 1960   Date of Visit: 2025    Follow-up Knee    CC: Follow-up RIGHT knee     History of Present Illness:  This patient presents with 6 years of RIGHT knee pain.     They were last seen for this problem on 24. At the last visit, the patient received a steroid injection of the right knee. The patient reports 12 weeks of 100% relief of knee pain.  She was very pleased with the result.  She has an upcoming trip to [facility] with right and would like repeat injection today.    Patient has tried the following NSAIDs, Activity modification, Physical therapy, Corticosteroid injections , and Xray. Date of last steroid injection: 24. Patient does occasionally have pain at night. Patient does not report falls related to this problem. Patient is able to walk unlimited blocks. Patient is currently using nothing as assistive device. Primarily complains of anterior pain. Patient has difficulty with descending stairs and prolonged sitting . The pain is significantly impacting their ability to perform activities of daily living. Patient reports no longer able to do activities such as Standing for prolonged periods of time.      Focused History  PMH: Reviewed and PE/DVT: no  PSH: Reviewed , Hip/Knee replacement: no, Hip/Knee surgery: no, Anesthesia complications: no, Spine surgery: no, Surgical infection: no, and Weight loss surgery: no  Meds: Reviewed, Current Anticoagulants: no, Weight loss medication: no, and Current Opioids: no  Allergies: Reviewed  and The patient reports no contraindications or allergies to cephalosporins, aspirin, NSAIDs or opioids, except as noted above.  FH: No family history of any bleeding or clotting disorders.  SHx: Reviewed, Occupation: , Current smoker: no, EtOH intake weekly: 1 to 2 glasses a week,  Social support: unk, and Preferred physical activities: Walking up to 5 miles a day  Dental Hx: Last routine cleaning: November 14, 2024 and Discussed that all invasive dental work must be completed at least 3 months prior to joint replacement surgery. Patient understands they are to avoid any invasive dental work 3-6 months post-surgically.   Jehovah´s Witness: no    HISTORY  PROMs   Promis Adult Short Form V1.0 Global Health    10/12/2023 11:40 AM EDT - Filed by Patient   In general, would you say your health is: Very Good   In general, would you say your quality of life is: Very good   In general, how would you rate your physical health? Very good   In general, how would you rate your mental health, including your mood and your ability to think? Very good   In general, how would you rate your satisfaction with your social activities and relationships? Very Good   In general, please rate how well you carry out your usual social activities and roles. (This includes activities at home, at work and in your community, and responsibilities as a parent, child, spouse, employee, friend, etc.) Very good   To what extent are you able to carry out your everyday physical activities such as walking, climbing stairs, carrying groceries, or moving a chair? Completely   In the past 7 days   How often have you been bothered by emotional problems such as feeling anxious, depressed or irritable? Rarely   How would you rate your fatigue on average? Moderate   How would you rate your pain on average? 4   PROMIS Adult Short Form-Global Health Score (Physical) (range: 16 - 68) 47.7   PROMIS Adult Short Form-Global Health Score (Mental) (range: 21 - 68) 53.3          Medical History[1]    Medical History[2]  Documented in chart and reviewed.     Surgical History[3]    Allergies: She has no known allergies.     Medications:  Current Outpatient Medications   Medication Instructions    cholecalciferol (VITAMIN D-3) 50 mcg    cyanocobalamin  (VITAMIN B-12) 1,000 mcg, Daily    estradiol (ESTRACE) 2 g, vaginal, 3 times weekly    estradiol (Vivelle-Dot) 0.05 mg/24 hr patch 1 patch, transdermal, 2 times weekly    olmesartan (BENICAR) 20 mg, oral, Daily    progesterone (PROMETRIUM) 100 mg, oral, Nightly       Family History[4]  Documented in chart and reviewed.     Social History     Tobacco Use    Smoking status: Never    Smokeless tobacco: Never   Substance Use Topics    Alcohol use: Not on file        Review of Systems: Review of systems completed with medical assistant intake. Please refer to this note.     Physical Exam:  BMI: Not assessed.    General: The patient is well appearing and has an appropriate affect.      Neurological Examination: SILT in SPN/DPN/Sural/Saphenous/Tibial nerves. 5/5 EHL, FHL, Tibial anterior, Gastrocnemius. Coordination grossly intact.      Cardiovascular Exam: Capillary refill <2 seconds.      Lymphatic Examination: There is no obvious lymphatic swelling present around the involved joint.     Skin Exam: Skin around the pertinent joint is without evidence of infection or rash.     Gait: patient ambulates with antalgic gait.     Right Hip Examination:  The skin is intact over the hip.     There is no tenderness over the greater trochanter.     Range of motion is full extension to 100 degrees of flexion.     The hip is stable without subluxation or dislocation.     The hip internally rotates to 15 degrees and externally rotates to 45 degrees.     There is no pain with hip motion.     Left Hip Examination:  The skin is intact over the hip.     There is no tenderness over the greater trochanter.     Range of motion is full extension to 100 degrees of flexion.     The hip is stable without subluxation or dislocation.     The hip internally rotates to 15 degrees and externally rotates to 45 degrees.     There is no pain with hip motion.     Right Knee Examination:  Examination of the knee reveals the skin to be intact.     There is  a moderate effusion in the knee.     The alignment of the knee is Valgus.     This deformity is partially correctable.     There is tenderness to palpation over the joint line.     There is moderate quadriceps atrophy.     Range of Motion: 5 to 110 degrees of flexion.     The knee is stable to varus-valgus stress and anterior-posterior stress.      There is moderate grinding with range of motion.     There is severe patellofemoral crepitus.     Left Knee Examination:  Examination of the knee reveals the skin to be intact.     There is a small effusion in the knee.     The alignment of the knee is Valgus.     This deformity is correctable.     There is mild tenderness to palpation over the joint line.     There is moderate quadriceps atrophy.     Range of Motion: 5 to 110 degrees of flexion.     The knee is stable to varus-valgus stress and anterior-posterior stress.      There is mild grinding with range of motion.     There is mild patellofemoral crepitus.    Imaging:  Radiographs were personally reviewed today. There is evidence of early severe RIGHT  knee osteoarthritis with near LATERAL  bone on bone apposition.    Impression and Plan:  This patient is here for follow-up evaluation of RIGHT knee.    DIAGNOSIS  Primary osteoarthritis of right knee     I have discussed the options in detail with the patient. We have discussed anti-inflammatory medication, activity modification, physical therapy, corticosteroid injections, viscosupplementation injections, partial knee replacement surgery and total knee replacement surgery. Patient is responding well to nonsurgical treatment measures.    The risks and benefits of all these treatment options have been discussed in detail.     The patient has tried at least 3 months of the above conservative treatments and continues to have disabling pain, impaired activities of daily living and worsened quality of life.  Reviewed the surgical optimization steps to optimize their  chances for a successful joint replacement surgery.      Has done physical therapy in the past. Patient will continue their home exercise program. Strategies for pain management using over-the-counter anti-inflammatory medications reviewed.  The patient was prescribed Meloxicam for pain management. The patient elects for a steroid injection, which was provided according to procedure note below. Discussed utility of brace. Will defer brace at this time.. Encouraged them to maintain range of motion and strength around the knee joints.  They will continue to implement these strategies in addressing their pain.      Recommend the patient continue optimizing nonsurgical treatment interventions as outlined above for management of their knee arthritis.  I would be happy to see them again at any point to discuss surgery if indicated or they are more optimized or to review progress with nonsurgical treatment of arthritis. The patient verbalizes understanding with the recommendations and treatment plan as outlined above and is in agreement.  Questions were addressed.    RTC: as needed    X-rays at next visit: as needed    Large Joint Injection 21357: Knee  Consent given by: Patient  Timeout: Immediately prior to procedure the correct patient, procedure, and site was verified. Sterile gloves and semi-sterile technique were used.   Indications: Knee pain and joint swelling.   Location: RIGHT knee  Needle size: 22 G  Approach: Lateral    Medications administered: Please refer to medical assistant note for lot number and expiration date.   Subcutaneous   4 ml of 1% lidocaine     Deep   4 ml of 1% lidocaine   4 mL 0.5% marcaine   1 mL of 40 mg kenalog     Patient tolerance: Dressing applied. Patient tolerated the procedure well with no immediate complications.    L Inj/Asp: R knee on 5/23/2025 12:45 PM  Indications: pain and joint swelling  Details: 22 G needle, lateral approach  Medications: 40 mg triamcinolone acetonide 40  mg/mL; 8 mL lidocaine 10 mg/mL (1 %); 4 mL BUPivacaine HCl 0.5 % (5 mg/mL)  Outcome: tolerated well, no immediate complications  Procedure, treatment alternatives, risks and benefits explained, specific risks discussed. Consent was given by the patient. Immediately prior to procedure a time out was called to verify the correct patient, procedure, equipment, support staff and site/side marked as required. Patient was prepped and draped in the usual sterile fashion.               _____________________  Ruby Bond MD   Attending Orthopaedic Surgeon  [facility]    [facility]    This office note was transcribed with dictation software.  Please excuse any typographical errors, program misunderstandings leading to inadvertent insertions or deletions of inappropriate wording, pronoun errors and other unintentional transcription errors not noticed on proof-reading.               [1]   Past Medical History:  Diagnosis Date    Other specified health status 2015    No pertinent past medical history   [2]   Past Medical History:  Diagnosis Date    Other specified health status 2015    No pertinent past medical history   [3]   Past Surgical History:  Procedure Laterality Date    ADENOIDECTOMY  2015    Adenoidectomy     SECTION, CLASSIC  ,      Section    OTHER SURGICAL HISTORY      endometrial ablation    RHINOPLASTY  2015    Rhinoplasty    TONSILLECTOMY  2015    Tonsillectomy   [4]   Family History  Problem Relation Name Age of Onset    Coronary artery disease Mother  75    Polycythemia Mother      Myelodysplastic syndrome Mother      Ulcerative colitis Father      Other (spiansl stenosis) Father      Atrial fibrillation Father      No Known Problems Sister      Other (djd) Brother      No Known Problems Brother      Ovarian cancer Mother's Sister      Breast cancer Mother's Sister  70

## 2025-05-23 NOTE — LETTER
May 23, 2025     Shanelle Boykin MD  [address] [place] [address]    Patient: Berta Galaviz   YOB: 1960   Date of Visit: 2025       Dear Dr. Shanelle Boykin MD:    Thank you for referring Berta Galaviz to me for evaluation. Below are my notes for this consultation.  If you have questions, please do not hesitate to call me. I look forward to following your patient along with you.       Sincerely,     Ruby Bond MD      CC: No Recipients  ______________________________________________________________________________________     Ruby Bond MD   Adult Reconstruction and Joint Replacement Surgery  Phone: [phone]     Fax: [phone]       Name: Berta Galaviz  Age: 64 y.o.   : 1960   Date of Visit: 2025    Follow-up Knee    CC: Follow-up RIGHT knee     History of Present Illness:  This patient presents with 6 years of RIGHT knee pain.     They were last seen for this problem on 24. At the last visit, the patient received a steroid injection of the right knee. The patient reports 12 weeks of 100% relief of knee pain.  She was very pleased with the result.  She has an upcoming trip to [facility] with right and would like repeat injection today.    Patient has tried the following NSAIDs, Activity modification, Physical therapy, Corticosteroid injections , and Xray. Date of last steroid injection: 24. Patient does occasionally have pain at night. Patient does not report falls related to this problem. Patient is able to walk unlimited blocks. Patient is currently using nothing as assistive device. Primarily complains of anterior pain. Patient has difficulty with descending stairs and prolonged sitting . The pain is significantly impacting their ability to perform activities of daily living. Patient reports no longer able to do activities such as Standing for prolonged periods of time.      Focused History  PMH: Reviewed and  PE/DVT: no  PSH: Reviewed , Hip/Knee replacement: no, Hip/Knee surgery: no, Anesthesia complications: no, Spine surgery: no, Surgical infection: no, and Weight loss surgery: no  Meds: Reviewed, Current Anticoagulants: no, Weight loss medication: no, and Current Opioids: no  Allergies: Reviewed  and The patient reports no contraindications or allergies to cephalosporins, aspirin, NSAIDs or opioids, except as noted above.  FH: No family history of any bleeding or clotting disorders.  SHx: Reviewed, Occupation: , Current smoker: no, EtOH intake weekly: 1 to 2 glasses a week, Social support: unk, and Preferred physical activities: Walking up to 5 miles a day  Dental Hx: Last routine cleaning: November 14, 2024 and Discussed that all invasive dental work must be completed at least 3 months prior to joint replacement surgery. Patient understands they are to avoid any invasive dental work 3-6 months post-surgically.   Jehovah´s Witness: no    HISTORY  PROMs   Promis Adult Short Form V1.0 Global Health    10/12/2023 11:40 AM EDT - Filed by Patient   In general, would you say your health is: Very Good   In general, would you say your quality of life is: Very good   In general, how would you rate your physical health? Very good   In general, how would you rate your mental health, including your mood and your ability to think? Very good   In general, how would you rate your satisfaction with your social activities and relationships? Very Good   In general, please rate how well you carry out your usual social activities and roles. (This includes activities at home, at work and in your community, and responsibilities as a parent, child, spouse, employee, friend, etc.) Very good   To what extent are you able to carry out your everyday physical activities such as walking, climbing stairs, carrying groceries, or moving a chair? Completely   In the past 7 days   How often have you been bothered by emotional problems such as  feeling anxious, depressed or irritable? Rarely   How would you rate your fatigue on average? Moderate   How would you rate your pain on average? 4   PROMIS Adult Short Form-Global Health Score (Physical) (range: 16 - 68) 47.7   PROMIS Adult Short Form-Global Health Score (Mental) (range: 21 - 68) 53.3          Medical History[1]    Medical History[2]  Documented in chart and reviewed.     Surgical History[3]    Allergies: She has no known allergies.     Medications:  Current Outpatient Medications   Medication Instructions   • cholecalciferol (VITAMIN D-3) 50 mcg   • cyanocobalamin (VITAMIN B-12) 1,000 mcg, Daily   • estradiol (ESTRACE) 2 g, vaginal, 3 times weekly   • estradiol (Vivelle-Dot) 0.05 mg/24 hr patch 1 patch, transdermal, 2 times weekly   • olmesartan (BENICAR) 20 mg, oral, Daily   • progesterone (PROMETRIUM) 100 mg, oral, Nightly       Family History[4]  Documented in chart and reviewed.     Social History     Tobacco Use   • Smoking status: Never   • Smokeless tobacco: Never   Substance Use Topics   • Alcohol use: Not on file        Review of Systems: Review of systems completed with medical assistant intake. Please refer to this note.     Physical Exam:  BMI: Not assessed.    General: The patient is well appearing and has an appropriate affect.      Neurological Examination: SILT in SPN/DPN/Sural/Saphenous/Tibial nerves. 5/5 EHL, FHL, Tibial anterior, Gastrocnemius. Coordination grossly intact.      Cardiovascular Exam: Capillary refill <2 seconds.      Lymphatic Examination: There is no obvious lymphatic swelling present around the involved joint.     Skin Exam: Skin around the pertinent joint is without evidence of infection or rash.     Gait: patient ambulates with antalgic gait.     Right Hip Examination:  The skin is intact over the hip.     There is no tenderness over the greater trochanter.     Range of motion is full extension to 100 degrees of flexion.     The hip is stable without  subluxation or dislocation.     The hip internally rotates to 15 degrees and externally rotates to 45 degrees.     There is no pain with hip motion.     Left Hip Examination:  The skin is intact over the hip.     There is no tenderness over the greater trochanter.     Range of motion is full extension to 100 degrees of flexion.     The hip is stable without subluxation or dislocation.     The hip internally rotates to 15 degrees and externally rotates to 45 degrees.     There is no pain with hip motion.     Right Knee Examination:  Examination of the knee reveals the skin to be intact.     There is a moderate effusion in the knee.     The alignment of the knee is Valgus.     This deformity is partially correctable.     There is tenderness to palpation over the joint line.     There is moderate quadriceps atrophy.     Range of Motion: 5 to 110 degrees of flexion.     The knee is stable to varus-valgus stress and anterior-posterior stress.      There is moderate grinding with range of motion.     There is severe patellofemoral crepitus.     Left Knee Examination:  Examination of the knee reveals the skin to be intact.     There is a small effusion in the knee.     The alignment of the knee is Valgus.     This deformity is correctable.     There is mild tenderness to palpation over the joint line.     There is moderate quadriceps atrophy.     Range of Motion: 5 to 110 degrees of flexion.     The knee is stable to varus-valgus stress and anterior-posterior stress.      There is mild grinding with range of motion.     There is mild patellofemoral crepitus.    Imaging:  Radiographs were personally reviewed today. There is evidence of early severe RIGHT  knee osteoarthritis with near LATERAL  bone on bone apposition.    Impression and Plan:  This patient is here for follow-up evaluation of RIGHT knee.    DIAGNOSIS  Primary osteoarthritis of right knee     I have discussed the options in detail with the patient. We have  discussed anti-inflammatory medication, activity modification, physical therapy, corticosteroid injections, viscosupplementation injections, partial knee replacement surgery and total knee replacement surgery. Patient is responding well to nonsurgical treatment measures.    The risks and benefits of all these treatment options have been discussed in detail.     The patient has tried at least 3 months of the above conservative treatments and continues to have disabling pain, impaired activities of daily living and worsened quality of life.  Reviewed the surgical optimization steps to optimize their chances for a successful joint replacement surgery.      Has done physical therapy in the past. Patient will continue their home exercise program. Strategies for pain management using over-the-counter anti-inflammatory medications reviewed.  The patient was prescribed Meloxicam for pain management. The patient elects for a steroid injection, which was provided according to procedure note below. Discussed utility of brace. Will defer brace at this time.. Encouraged them to maintain range of motion and strength around the knee joints.  They will continue to implement these strategies in addressing their pain.      Recommend the patient continue optimizing nonsurgical treatment interventions as outlined above for management of their knee arthritis.  I would be happy to see them again at any point to discuss surgery if indicated or they are more optimized or to review progress with nonsurgical treatment of arthritis. The patient verbalizes understanding with the recommendations and treatment plan as outlined above and is in agreement.  Questions were addressed.    RTC: as needed    X-rays at next visit: as needed    Large Joint Injection 91450: Knee  Consent given by: Patient  Timeout: Immediately prior to procedure the correct patient, procedure, and site was verified. Sterile gloves and semi-sterile technique were used.    Indications: Knee pain and joint swelling.   Location: RIGHT knee  Needle size: 22 G  Approach: Lateral    Medications administered: Please refer to medical assistant note for lot number and expiration date.   Subcutaneous   4 ml of 1% lidocaine     Deep   4 ml of 1% lidocaine   4 mL 0.5% marcaine   1 mL of 40 mg kenalog     Patient tolerance: Dressing applied. Patient tolerated the procedure well with no immediate complications.    L Inj/Asp: R knee on 2025 12:45 PM  Indications: pain and joint swelling  Details: 22 G needle, lateral approach  Medications: 40 mg triamcinolone acetonide 40 mg/mL; 8 mL lidocaine 10 mg/mL (1 %); 4 mL BUPivacaine HCl 0.5 % (5 mg/mL)  Outcome: tolerated well, no immediate complications  Procedure, treatment alternatives, risks and benefits explained, specific risks discussed. Consent was given by the patient. Immediately prior to procedure a time out was called to verify the correct patient, procedure, equipment, support staff and site/side marked as required. Patient was prepped and draped in the usual sterile fashion.               _____________________  Ruby Bond MD   Attending Orthopaedic Surgeon  [facility]    [facility]    This office note was transcribed with dictation software.  Please excuse any typographical errors, program misunderstandings leading to inadvertent insertions or deletions of inappropriate wording, pronoun errors and other unintentional transcription errors not noticed on proof-reading.               [1]  Past Medical History:  Diagnosis Date   • Other specified health status 2015    No pertinent past medical history   [2]  Past Medical History:  Diagnosis Date   • Other specified health status 2015    No pertinent past medical history   [3]  Past Surgical History:  Procedure Laterality Date   • ADENOIDECTOMY  2015    Adenoidectomy   •  SECTION, CLASSIC  ,       Section   • OTHER SURGICAL HISTORY      endometrial ablation   • RHINOPLASTY  2015    Rhinoplasty   • TONSILLECTOMY  2015    Tonsillectomy   [4]  Family History  Problem Relation Name Age of Onset   • Coronary artery disease Mother  75   • Polycythemia Mother     • Myelodysplastic syndrome Mother     • Ulcerative colitis Father     • Other (spiansl stenosis) Father     • Atrial fibrillation Father     • No Known Problems Sister     • Other (djd) Brother     • No Known Problems Brother     • Ovarian cancer Mother's Sister     • Breast cancer Mother's Sister  70

## 2025-07-08 DIAGNOSIS — I10 PRIMARY HYPERTENSION: ICD-10-CM

## 2025-07-08 RX ORDER — OLMESARTAN MEDOXOMIL 20 MG/1
20 TABLET ORAL DAILY
Qty: 90 TABLET | Refills: 1 | Status: SHIPPED | OUTPATIENT
Start: 2025-07-08

## 2025-08-11 DIAGNOSIS — N95.1 MENOPAUSAL SYMPTOMS: ICD-10-CM

## 2025-08-13 RX ORDER — PROGESTERONE 100 MG/1
100 CAPSULE ORAL NIGHTLY
Qty: 90 CAPSULE | Refills: 1 | Status: SHIPPED | OUTPATIENT
Start: 2025-08-13

## 2025-10-16 ENCOUNTER — APPOINTMENT (OUTPATIENT)
Dept: UROLOGY | Facility: CLINIC | Age: 65
End: 2025-10-16
Payer: COMMERCIAL